# Patient Record
Sex: FEMALE | Race: WHITE | Employment: OTHER | URBAN - METROPOLITAN AREA
[De-identification: names, ages, dates, MRNs, and addresses within clinical notes are randomized per-mention and may not be internally consistent; named-entity substitution may affect disease eponyms.]

---

## 2018-02-26 ENCOUNTER — OFFICE VISIT (OUTPATIENT)
Dept: INTERNAL MEDICINE CLINIC | Age: 63
End: 2018-02-26

## 2018-02-26 VITALS
DIASTOLIC BLOOD PRESSURE: 94 MMHG | BODY MASS INDEX: 26.19 KG/M2 | TEMPERATURE: 97 F | HEIGHT: 63 IN | SYSTOLIC BLOOD PRESSURE: 130 MMHG | RESPIRATION RATE: 15 BRPM | HEART RATE: 73 BPM | WEIGHT: 147.8 LBS | OXYGEN SATURATION: 96 %

## 2018-02-26 DIAGNOSIS — Z88.9 MULTIPLE ALLERGIES: ICD-10-CM

## 2018-02-26 DIAGNOSIS — E04.9 GOITER: ICD-10-CM

## 2018-02-26 DIAGNOSIS — G43.009 MIGRAINE WITHOUT AURA AND WITHOUT STATUS MIGRAINOSUS, NOT INTRACTABLE: ICD-10-CM

## 2018-02-26 DIAGNOSIS — J45.909 MODERATE ASTHMA WITHOUT COMPLICATION, UNSPECIFIED WHETHER PERSISTENT: ICD-10-CM

## 2018-02-26 DIAGNOSIS — E55.9 VITAMIN D DEFICIENCY: ICD-10-CM

## 2018-02-26 DIAGNOSIS — I10 ESSENTIAL HYPERTENSION: Primary | ICD-10-CM

## 2018-02-26 RX ORDER — ALBUTEROL SULFATE 90 UG/1
AEROSOL, METERED RESPIRATORY (INHALATION)
Refills: 0 | COMMUNITY
Start: 2018-02-14

## 2018-02-26 RX ORDER — OLMESARTAN MEDOXOMIL AND HYDROCHLOROTHIAZIDE 40/25 40; 25 MG/1; MG/1
TABLET ORAL
Refills: 0 | COMMUNITY
Start: 2018-01-19 | End: 2018-05-17 | Stop reason: SDUPTHER

## 2018-02-26 RX ORDER — LORATADINE 10 MG/1
10 TABLET ORAL
COMMUNITY

## 2018-02-26 RX ORDER — MECLIZINE HYDROCHLORIDE 25 MG/1
TABLET ORAL
COMMUNITY
End: 2018-02-26

## 2018-02-26 RX ORDER — FLUTICASONE PROPIONATE 50 MCG
2 SPRAY, SUSPENSION (ML) NASAL DAILY
COMMUNITY

## 2018-02-26 RX ORDER — SUMATRIPTAN 100 MG/1
TABLET, FILM COATED ORAL
Refills: 1 | COMMUNITY
Start: 2017-11-26

## 2018-02-26 RX ORDER — METHYLPREDNISOLONE 4 MG/1
TABLET ORAL
COMMUNITY
Start: 2018-01-22 | End: 2018-02-26

## 2018-02-26 RX ORDER — AA/PROT/LYSINE/METHIO/VIT C/B6 50-12.5 MG
TABLET ORAL
COMMUNITY

## 2018-02-26 RX ORDER — CEFDINIR 300 MG/1
CAPSULE ORAL
COMMUNITY
Start: 2018-01-10 | End: 2018-02-26

## 2018-02-26 RX ORDER — MELOXICAM 7.5 MG/1
TABLET ORAL AS NEEDED
COMMUNITY

## 2018-02-26 RX ORDER — BISMUTH SUBSALICYLATE 262 MG
1 TABLET,CHEWABLE ORAL DAILY
COMMUNITY

## 2018-02-26 RX ORDER — GUAIFENESIN 600 MG/1
600 TABLET, EXTENDED RELEASE ORAL 2 TIMES DAILY
COMMUNITY
End: 2018-02-26

## 2018-02-26 RX ORDER — MULTIVIT WITH MINERALS/HERBS
1 TABLET ORAL DAILY
COMMUNITY

## 2018-02-26 RX ORDER — SULFAMETHOXAZOLE AND TRIMETHOPRIM 800; 160 MG/1; MG/1
TABLET ORAL
COMMUNITY
Start: 2018-01-22 | End: 2018-02-26

## 2018-02-26 NOTE — PATIENT INSTRUCTIONS
microbiome   Beclomethasone (By breathing)   Beclomethasone (be-kloe-METH-a-sone)  Prevents asthma attacks. This medicine is a corticosteroid. Brand Name(s): QVAR   There may be other brand names for this medicine. When This Medicine Should Not Be Used: This medicine is not right for everyone. Do not use it if you had an allergic reaction to beclomethasone or while you are having an asthma attack. How to Use This Medicine:   Liquid Under Pressure, Powder Under Pressure, Spray  · Take your medicine as directed. Your dose may need to be changed several times to find what works best for you. · Read and follow the patient instructions that come with this medicine. Talk to your doctor or pharmacist if you have any questions. · You will use this medicine with a device called a metered-dose inhaler. The inhaler fits on the medicine canister and turns the medicine into a fine spray that you breathe in through your mouth and to your lungs. You may be told to use a spacer, which is a tube that is placed between the inhaler and your mouth. Your caregiver will show you how to use your inhaler and the spacer (if needed). · Do not remove the canister from the actuator. · To inhale this medicine, breathe out fully, trying to get as much air out of the lungs as possible. Put the mouthpiece just in front of your mouth with the canister upright. Do not breathe into the inhaler. · Open your mouth and breathe in slowly and deeply (like yawning), and at the same time firmly press down on the top of the canister once. · Hold your breath for about 5 to 10 seconds, and then breathe out slowly. · When you have finished all your inhalations, rinse your mouth out with water. Do not swallow the water. · Wipe the mouthpiece dry with a cloth or tissue. Do not wash or put any part of the inhaler in water. · QVAR® inhaler:   ¨ Remove the cap and look at the mouthpiece to make sure it is clean.   ¨ Do not remove the canister from the actuator. ¨ Prime the inhaler before you use it for the first time, or if it has not been used for 10 days or more. Prime it by pointing it away from your face and spraying into the air 2 times. ¨ If you are supposed to use more than one puff, wait 1 to 2 minutes before inhaling the second puff. Repeat these steps for the next puff, starting with shaking the inhaler. · QVAR® Redihaler:   ¨ Do not shake or prime the inhaler. Do not use it with a spacer or volume holding chamber. ¨ Do not open the white cap of the inhaler or leave it open unless you are ready to use it. Close the cap before each use or if it has been left open for more than 2 minutes. ¨ The dose counter will turn red when the inhaler has 20 or fewer doses left. Stop using the inhaler when it reaches 0.  · Missed dose: Take a dose as soon as you remember. If it is almost time for your next dose, wait until then and take a regular dose. Do not take extra medicine to make up for a missed dose. If you miss a dose of QVAR® Redihaler, skip the missed dose and go back to your regular dosing schedule. · Store the canister at room temperature, away from heat and direct light. Do not freeze. Do not keep this medicine inside a car where it could be exposed to extreme heat or cold. Do not poke holes in the canister or throw it into a fire, even if the canister is empty. · Throw away the inhaler when the expiration date passes or the counter reads 0. Drugs and Foods to Avoid:      Ask your doctor or pharmacist before using any other medicine, including over-the-counter medicines, vitamins, and herbal products. Warnings While Using This Medicine:   · Tell your doctor if you are pregnant or breastfeeding, or if you have cataracts, glaucoma, or osteoporosis. Tell your doctor if you have any immune system problems or infections, including herpes simplex in your eye or tuberculosis.  Tell your doctor right away if you are exposed to measles or chickenpox. · This medicine may cause the following problems:  ¨ Increased risk of infection, including fungus infection in the mouth (thrush)  ¨ Adrenal gland problems  ¨ Increased trouble breathing right after use (paradoxical bronchospasm)  ¨ Slow growth in children  ¨ Low bone mineral density, which may lead to osteoporosis  ¨ Glaucoma or cataracts  · This medicine will not stop an asthma attack that has already started. You should have another medicine to use in case of an acute asthma attack. · If any of your asthma medicines do not seem to be working as well as usual, call your doctor right away. Do not change your doses or stop using your medicines without asking your doctor. · You may need to use this medicine for 1 to 4 weeks before your asthma starts to get better. Call your doctor if your symptoms do not improve or if they get worse. · Keep all medicine out of the reach of children. Never share your medicine with anyone. Possible Side Effects While Using This Medicine:   Call your doctor right away if you notice any of these side effects:  · Allergic reaction: Itching or hives, swelling in your face or hands, swelling or tingling in your mouth or throat, chest tightness, trouble breathing  · Color changes on the skin, dark freckles, easy bruising, muscle weakness, round or puffy face  · Eye pain or vision changes  · Fever, chills, cough, stuffy or runny nose, sneezing, sore throat, body aches  · Tiredness, weakness, nausea and vomiting, dizziness  · Worsening of breathing problems  If you notice these less serious side effects, talk with your doctor:   · Headache  · Sores or white patches in your mouth or throat, pain when eating or swallowing  · Weight changes (in children)  If you notice other side effects that you think are caused by this medicine, tell your doctor. Call your doctor for medical advice about side effects.  You may report side effects to FDA at 0-736-FDA-1117  © 2017 [facility] [address] is for End User's use only and may not be sold, redistributed or otherwise used for commercial purposes. The above information is an  only. It is not intended as medical advice for individual conditions or treatments. Talk to your doctor, nurse or pharmacist before following any medical regimen to see if it is safe and effective for you.     Seaweed (laver)

## 2018-02-26 NOTE — PROGRESS NOTES
Establish Care (New patient, here ro establish care.)       HPI:  Isabel Ball is a 57y.o. year old female who is here to establish care. She  had her medical care:    Relocated from [place] this summer  Went to [facility].    She reports the following history and medical concerns:      HTN- Benicar HCT- has had a long time. Usually in very good control. Stress level high-  is very ill. End stage Kidney disease. He is 80year old. Hx of asthma- on ventolin prn. In good control. Several times a week. Some SOB since moving here. But has asthma for 10 years. Migraines- doesn't take often. Once or twice a year. \"rigid big toe\" originally. Not takes it sparingly for still muscles and neck. Allergies- claritin and flonase    Supplements- quercetin. - not feeling well. Went to the [facility]. Negative work up. After moving here, felt better. Taking probiotic    Colonoscopy- almost due for one. Had one 9 years. Needs one in 2019. No polyps. Mammogram- due in may 2018. Had shingles vaccine at age 59. Doesn't remember tetanus shot. Been in last 10 years. Last blood test a year ago. Hx of goiter. No further work up needed. Has had ultrasounds and told not malignant. No further ultrasounds needed. Assessment and Plan        1. Essential hypertension  Continue present management. No changes made to regimen. Tolerating medication. Discussion on its effectiveness and queried about side effects. Patient agrees to stay on medication and demonstrate compliance. No adjustments made on    - olmesartan-hydroCHLOROthiazide (BENICAR HCT) 40-25 mg per tablet; TK 1 T PO QD; Refill: 0  - CBC WITH AUTOMATED DIFF  - TSH REFLEX TO T4  - METABOLIC PANEL, COMPREHENSIVE  - MICROALBUMIN, UR, RAND W/ MICROALB/CREAT RATIO  - UA/M W/RFLX CULTURE, ROUTINE    2. Multiple allergies  Continue present management. No changes made to regimen.   Check Vit D  - VENTOLIN HFA 90 mcg/actuation inhaler; INL 2 INHALATIONS PO Q 4 TO 6 H PRF BREATHING; Refill: 0  - fluticasone (FLONASE ALLERGY RELIEF) 50 mcg/actuation nasal spray; 2 Sprays by Both Nostrils route daily. - loratadine (CLARITIN) 10 mg tablet; Take 10 mg by mouth.  - QUERCETIN DIHYDRATE, BULK,; by Does Not Apply route.  - B.infantis-B.ani-B.long-B.bifi (PROBIOTIC 4X) 10-15 mg TbEC; Take  by mouth. - multivitamin (ONE A DAY) tablet; Take 1 Tab by mouth daily. - b complex vitamins (B COMPLEX 1) tablet; Take 1 Tab by mouth daily. - coenzyme q10 (CO Q-10) 10 mg cap; Take  by mouth.  - TSH REFLEX TO T4  - VITAMIN D, 25 HYDROXY    3. Moderate asthma without complication, unspecified whether persistent  Add Qvar. Rinse mouth out. Use albuterol for rescue only. She has been using 3 times a week. - beclomethasone (QVAR) 80 mcg/actuation aero; Take 1 Puff by inhalation two (2) times a day. Dispense: 1 Inhaler; Refill: 3    4. Migraine without aura and without status migrainosus, not intractable  Continue present management. No changes made to regimen.   - SUMAtriptan (IMITREX) 100 mg tablet; TK 1 T PO BID PRN; Refill: 1    5. Vitamin D deficiency  Recheck Vit D.   - VITAMIN D, 25 HYDROXY    6. Goiter  Says no further work up needed.   Get old records          Visit Vitals    BP (!) 130/94 (BP 1 Location: Left arm, BP Patient Position: Sitting)    Pulse 73    Temp 97 °F (36.1 °C) (Oral)    Resp 15    Ht 5' 2.5\" (1.588 m)    Wt 147 lb 12.8 oz (67 kg)    SpO2 96%    BMI 26.6 kg/m2       Historical Data    Past Medical History:   Diagnosis Date    Hypertension     Plantar fasciitis        Past Surgical History:   Procedure Laterality Date    HX  SECTION      x2    HX CHOLECYSTECTOMY      HX HYSTERECTOMY      HX MYOMECTOMY      HX SEPTOPLASTY      HX TUBAL LIGATION         Outpatient Encounter Prescriptions as of 2018   Medication Sig Dispense Refill    VENTOLIN HFA 90 mcg/actuation inhaler INL 2 INHALATIONS PO Q 4 TO 6 H PRF BREATHING  0    olmesartan-hydroCHLOROthiazide (BENICAR HCT) 40-25 mg per tablet TK 1 T PO QD  0    SUMAtriptan (IMITREX) 100 mg tablet TK 1 T PO BID PRN  1    meloxicam (MOBIC) 7.5 mg tablet Take  by mouth as needed for Pain.  fluticasone (FLONASE ALLERGY RELIEF) 50 mcg/actuation nasal spray 2 Sprays by Both Nostrils route daily.  guaiFENesin ER (MUCINEX) 600 mg ER tablet Take 600 mg by mouth two (2) times a day.  loratadine (CLARITIN) 10 mg tablet Take 10 mg by mouth.  meclizine (ANTIVERT) 25 mg tablet Take  by mouth three (3) times daily as needed.  QUERCETIN DIHYDRATE, BULK, by Does Not Apply route.  B.infantis-B.ani-B.long-B.bifi (PROBIOTIC 4X) 10-15 mg TbEC Take  by mouth.  multivitamin (ONE A DAY) tablet Take 1 Tab by mouth daily.  b complex vitamins (B COMPLEX 1) tablet Take 1 Tab by mouth daily.  coenzyme q10 (CO Q-10) 10 mg cap Take  by mouth.  OTHER,NON-FORMULARY, Indications: Sinus Rinse      cefdinir (OMNICEF) 300 mg capsule       methylPREDNISolone (MEDROL DOSEPACK) 4 mg tablet       trimethoprim-sulfamethoxazole (BACTRIM DS, SEPTRA DS) 160-800 mg per tablet        No facility-administered encounter medications on file as of 2/26/2018. Allergies   Allergen Reactions    Ampicillin Rash    Atenolol Other (comments)     Unknown    Lisinopril Other (comments)     Unknown          Social History     Social History    Marital status:      Spouse name: N/A    Number of children: N/A    Years of education: N/A     Occupational History    Not on file.      Social History Main Topics    Smoking status: Never Smoker    Smokeless tobacco: Never Used    Alcohol use 0.6 - 1.2 oz/week     1 - 2 Glasses of wine per week    Drug use: No    Sexual activity: No     Other Topics Concern    Not on file     Social History Narrative    No narrative on file        family history includes Cancer in her mother; Heart Disease in her father; Hypertension in her father and sister; Other in her mother. Review of Systems   Constitutional: Negative for chills, diaphoresis, fever, malaise/fatigue and weight loss. HENT: Negative for hearing loss. Respiratory: Positive for shortness of breath. Negative for cough, hemoptysis, sputum production and wheezing. Cardiovascular: Negative for chest pain. Gastrointestinal: Negative for blood in stool and constipation. Genitourinary: Negative for dysuria, flank pain, frequency and urgency. Musculoskeletal: Negative for myalgias. Skin: Negative for rash. Neurological: Negative for dizziness, weakness and headaches. Endo/Heme/Allergies: Does not bruise/bleed easily. Physical Exam   Constitutional: She is oriented to person, place, and time. She appears well-developed and well-nourished. She is active. Non-toxic appearance. She does not have a sickly appearance. She does not appear ill. No distress. HENT:   Mouth/Throat: No oropharyngeal exudate. Eyes: Conjunctivae are normal. Right eye exhibits no discharge. Neck: Thyromegaly present. Cardiovascular: Normal rate, regular rhythm, S1 normal, S2 normal, normal heart sounds and normal pulses. Exam reveals no gallop and no friction rub. Pulmonary/Chest: Effort normal and breath sounds normal. No respiratory distress. Abdominal: Soft. Bowel sounds are normal. She exhibits no distension. Musculoskeletal: She exhibits no edema or deformity. Lymphadenopathy:     She has no cervical adenopathy. Neurological: She is alert and oriented to person, place, and time. Skin: Skin is warm and dry. No rash noted. No pallor. Psychiatric: She has a normal mood and affect. Her behavior is normal.   Vitals reviewed.      Ortho Exam       Orders Placed This Encounter    VENTOLIN HFA 90 mcg/actuation inhaler     Sig: INL 2 INHALATIONS PO Q 4 TO 6 H PRF BREATHING     Refill:  0    cefdinir (OMNICEF) 300 mg capsule    methylPREDNISolone (MEDROL DOSEPACK) 4 mg tablet    olmesartan-hydroCHLOROthiazide (BENICAR HCT) 40-25 mg per tablet     Sig: TK 1 T PO QD     Refill:  0    trimethoprim-sulfamethoxazole (BACTRIM DS, SEPTRA DS) 160-800 mg per tablet    SUMAtriptan (IMITREX) 100 mg tablet     Sig: TK 1 T PO BID PRN     Refill:  1    meloxicam (MOBIC) 7.5 mg tablet     Sig: Take  by mouth as needed for Pain.  fluticasone (FLONASE ALLERGY RELIEF) 50 mcg/actuation nasal spray     Si Sprays by Both Nostrils route daily.  guaiFENesin ER (MUCINEX) 600 mg ER tablet     Sig: Take 600 mg by mouth two (2) times a day.  loratadine (CLARITIN) 10 mg tablet     Sig: Take 10 mg by mouth.  meclizine (ANTIVERT) 25 mg tablet     Sig: Take  by mouth three (3) times daily as needed.  QUERCETIN DIHYDRATE, BULK,     Sig: by Does Not Apply route.  B.infantis-B.ani-B.long-B.bifi (PROBIOTIC 4X) 10-15 mg TbEC     Sig: Take  by mouth.  multivitamin (ONE A DAY) tablet     Sig: Take 1 Tab by mouth daily.  b complex vitamins (B COMPLEX 1) tablet     Sig: Take 1 Tab by mouth daily.  coenzyme q10 (CO Q-10) 10 mg cap     Sig: Take  by mouth. Dudley Gu,     Sig: Indications: Sinus Rinse        I have reviewed the patient's medical history in detail and updated the computerized patient record. We had a prolonged discussion about these complex clinical issues and went over the various important aspects to consider. All questions were answered. Advised her to call back or return to office if symptoms do not improve, change in nature, or persist.    She was given an after visit summary or informed of [employer] [facility] which includes patient instructions, diagnoses, current medications, & vitals. She expressed understanding with the diagnosis and plan.

## 2018-02-26 NOTE — PROGRESS NOTES
Chief Complaint   Patient presents with   [facility] patient, here ro establish care. 1. Have you been to the ER, urgent care clinic since your last visit? Hospitalized since your last visit? No    2. Have you seen or consulted any other health care providers outside of the [address] [place] [address] since your last visit? Include any pap smears or colon screening.  No

## 2018-02-26 NOTE — MR AVS SNAPSHOT
[address] [facility], [address] [place] [address] 
[phone] Patient: Siddhartha Franklin MRN: [id number] DKX:[id number] Visit Information Date & Time Provider Department Dept. Phone Encounter #  
 2/26/2018  9:15 AM MD Elisabeth Urib[facility] [phone] Upcoming Health Maintenance Date Due Hepatitis C Screening 1955 DTaP/Tdap/Td series (1 - Tdap) 7/6/1974 PAP AKA CERVICAL CYTOLOGY 7/6/1974 BREAST CANCER SCRN MAMMOGRAM 7/6/2003 FOBT Q 1 YEAR AGE 50-75 7/6/2003 ZOSTER VACCINE AGE 60> 5/6/2013 Allergies as of 2/26/2018  Review Complete On: 2/26/2018 By: Jim Gooden MD  
  
 Severity Noted Reaction Type Reactions Ampicillin  02/26/2018    Rash Atenolol  02/26/2018    Other (comments) Unknown Lisinopril  02/26/2018    Other (comments) Unknown Current Immunizations  Never Reviewed No immunizations on file. Not reviewed this visit You Were Diagnosed With   
  
 Codes Comments Essential hypertension    -  Primary ICD-10-CM: I10 
ICD-9-CM: 401.9 Multiple allergies     ICD-10-CM: Z88.9 ICD-9-CM: V15.09 Moderate asthma without complication, unspecified whether persistent     ICD-10-CM: J45.909 ICD-9-CM: 493.90 Migraine without aura and without status migrainosus, not intractable     ICD-10-CM: G52.732 ICD-9-CM: 346.10 Vitamin D deficiency     ICD-10-CM: E55.9 ICD-9-CM: 268.9 Vitals BP Pulse Temp Resp Height(growth percentile) Weight(growth percentile) (!) 130/94 (BP 1 Location: Left arm, BP Patient Position: Sitting) 73 97 °F (36.1 °C) (Oral) 15 5' 2.5\" (1.588 m) 147 lb 12.8 oz (67 kg) SpO2 BMI OB Status Smoking Status 96% 26.6 kg/m2 Hysterectomy Never Smoker Vitals History BMI and BSA Data Body Mass Index Body Surface Area  
 26.6 kg/m 2 1.72 m 2 Preferred Pharmacy Pharmacy Name Phone [address], [address] Your Updated Medication List  
  
   
This list is accurate as of 2/26/18 10:14 AM.  Always use your most recent med list.  
  
  
  
  
 B COMPLEX 1 tablet Generic drug:  b complex vitamins Take 1 Tab by mouth daily. beclomethasone 80 mcg/actuation (In)Touch Network Commonly known as:  QVAR Take 1 Puff by inhalation two (2) times a day. CO Q-10 10 mg Cap Generic drug:  coenzyme q10 Take  by mouth. FLONASE ALLERGY RELIEF 50 mcg/actuation nasal spray Generic drug:  fluticasone 2 Sprays by Both Nostrils route daily. loratadine 10 mg tablet Commonly known as:  Love Mcmillan Take 10 mg by mouth.  
  
 meloxicam 7.5 mg tablet Commonly known as:  MOBIC Take  by mouth as needed for Pain.  
  
 multivitamin tablet Commonly known as:  ONE A DAY Take 1 Tab by mouth daily. olmesartan-hydroCHLOROthiazide 40-25 mg per tablet Commonly known as:  BENICAR HCT TK 1 T PO QD  
  
 OTHER(NON-FORMULARY) Indications: Sinus Rinse PROBIOTIC 4X 10-15 mg Tbec Generic drug:  B.infantis-B.ani-B.long-B.bifi Take  by mouth. QUERCETIN DIHYDRATE (BULK)  
by Does Not Apply route. SUMAtriptan 100 mg tablet Commonly known as:  IMITREX TK 1 T PO BID PRN  
  
 VENTOLIN HFA 90 mcg/actuation inhaler Generic drug:  albuterol INL 2 INHALATIONS PO Q 4 TO 6 H PRF BREATHING  
  
  
  
  
Prescriptions Sent to Pharmacy Refills  
 beclomethasone (QVAR) 80 mcg/actuation aero 3 Sig: Take 1 Puff by inhalation two (2) times a day. Class: Normal  
 Pharmacy: [address] [place] [address] [address] [place] [address] Ph #: [phone] Route: Inhalation We Performed the Following CBC WITH AUTOMATED DIFF [24180 CPT(R)] METABOLIC PANEL, COMPREHENSIVE [21331 CPT(R)] MICROALBUMIN, UR, RAND W/ MICROALB/CREAT RATIO [id number] CPT(R)] TSH REFLEX TO T4 [98665 CPT(R)] UA/M W/RFLX CULTURE, ROUTINE [[id number] Custom] VITAMIN D, 25 HYDROXY [id number] CPT(R)] Patient Instructions   
microbiome Beclomethasone (By breathing) Beclomethasone (be-kloe-METH-a-sone) Prevents asthma attacks. This medicine is a corticosteroid. Brand Name(s): QVAR There may be other brand names for this medicine. When This Medicine Should Not Be Used: This medicine is not right for everyone. Do not use it if you had an allergic reaction to beclomethasone or while you are having an asthma attack. How to Use This Medicine:  
Liquid Under Pressure, Powder Under Pressure, Spray · Take your medicine as directed. Your dose may need to be changed several times to find what works best for you. · Read and follow the patient instructions that come with this medicine. Talk to your doctor or pharmacist if you have any questions. · You will use this medicine with a device called a metered-dose inhaler. The inhaler fits on the medicine canister and turns the medicine into a fine spray that you breathe in through your mouth and to your lungs. You may be told to use a spacer, which is a tube that is placed between the inhaler and your mouth. Your caregiver will show you how to use your inhaler and the spacer (if needed). · Do not remove the canister from the actuator. · To inhale this medicine, breathe out fully, trying to get as much air out of the lungs as possible. Put the mouthpiece just in front of your mouth with the canister upright. Do not breathe into the inhaler. · Open your mouth and breathe in slowly and deeply (like yawning), and at the same time firmly press down on the top of the canister once. · Hold your breath for about 5 to 10 seconds, and then breathe out slowly. · When you have finished all your inhalations, rinse your mouth out with water. Do not swallow the water. · Wipe the mouthpiece dry with a cloth or tissue. Do not wash or put any part of the inhaler in water. · QVAR® inhaler: ¨ Remove the cap and look at the mouthpiece to make sure it is clean. ¨ Do not remove the canister from the actuator. ¨ Prime the inhaler before you use it for the first time, or if it has not been used for 10 days or more. Prime it by pointing it away from your face and spraying into the air 2 times. ¨ If you are supposed to use more than one puff, wait 1 to 2 minutes before inhaling the second puff. Repeat these steps for the next puff, starting with shaking the inhaler. · QVAR® Redihaler:  
¨ Do not shake or prime the inhaler. Do not use it with a spacer or volume holding chamber. ¨ Do not open the white cap of the inhaler or leave it open unless you are ready to use it. Close the cap before each use or if it has been left open for more than 2 minutes. ¨ The dose counter will turn red when the inhaler has 20 or fewer doses left. Stop using the inhaler when it reaches 0. 
· Missed dose: Take a dose as soon as you remember. If it is almost time for your next dose, wait until then and take a regular dose. Do not take extra medicine to make up for a missed dose. If you miss a dose of QVAR® Redihaler, skip the missed dose and go back to your regular dosing schedule. · Store the canister at room temperature, away from heat and direct light. Do not freeze. Do not keep this medicine inside a car where it could be exposed to extreme heat or cold. Do not poke holes in the canister or throw it into a fire, even if the canister is empty. · Throw away the inhaler when the expiration date passes or the counter reads 0. Drugs and Foods to Avoid: Ask your doctor or pharmacist before using any other medicine, including over-the-counter medicines, vitamins, and herbal products. Warnings While Using This Medicine: · Tell your doctor if you are pregnant or breastfeeding, or if you have cataracts, glaucoma, or osteoporosis. Tell your doctor if you have any immune system problems or infections, including herpes simplex in your eye or tuberculosis. Tell your doctor right away if you are exposed to measles or chickenpox. · This medicine may cause the following problems: 
¨ Increased risk of infection, including fungus infection in the mouth (thrush) ¨ Adrenal gland problems ¨ Increased trouble breathing right after use (paradoxical bronchospasm) ¨ Slow growth in children ¨ Low bone mineral density, which may lead to osteoporosis ¨ Glaucoma or cataracts · This medicine will not stop an asthma attack that has already started. You should have another medicine to use in case of an acute asthma attack. · If any of your asthma medicines do not seem to be working as well as usual, call your doctor right away. Do not change your doses or stop using your medicines without asking your doctor. · You may need to use this medicine for 1 to 4 weeks before your asthma starts to get better. Call your doctor if your symptoms do not improve or if they get worse. · Keep all medicine out of the reach of children. Never share your medicine with anyone. Possible Side Effects While Using This Medicine:  
Call your doctor right away if you notice any of these side effects: · Allergic reaction: Itching or hives, swelling in your face or hands, swelling or tingling in your mouth or throat, chest tightness, trouble breathing · Color changes on the skin, dark freckles, easy bruising, muscle weakness, round or puffy face · Eye pain or vision changes · Fever, chills, cough, stuffy or runny nose, sneezing, sore throat, body aches · Tiredness, weakness, nausea and vomiting, dizziness · Worsening of breathing problems If you notice these less serious side effects, talk with your doctor:  
· Headache · Sores or white patches in your mouth or throat, pain when eating or swallowing · Weight changes (in children) If you notice other side effects that you think are caused by this medicine, tell your doctor. Call your doctor for medical advice about side effects. You may report side effects to FDA at 4-232-DKL-4402 © 2017 Justin Luna Information is for End User's use only and may not be sold, redistributed or otherwise used for commercial purposes. The above information is an  only. It is not intended as medical advice for individual conditions or treatments. Talk to your doctor, nurse or pharmacist before following any medical regimen to see if it is safe and effective for you. Introducing [facility]! [employer] introduces [employer] patient portal. Now you can access parts of your medical record, email your doctor's office, and request medication refills online. 1. In your internet browser, go to [email or web address] 2. Click on the First Time User? Click Here link in the Sign In box. You will see the New Member Sign Up page. 3. Enter your [employer] Access Code exactly as it appears below. You will not need to use this code after youve completed the sign-up process. If you do not sign up before the expiration date, you must request a new code. · [employer] Access Code: [email or web address] Expires: 5/27/2018 10:13 AM 
 
4. Enter the last four digits of your Social Security Number (xxxx) and Date of Birth (mm/dd/yyyy) as indicated and click Submit. You will be taken to the next sign-up page. 5. Create a Mimeot ID. This will be your [employer] login ID and cannot be changed, so think of one that is secure and easy to remember. 6. Create a [employer] password. You can change your password at any time. 7. Enter your Password Reset Question and Answer. This can be used at a later time if you forget your password. 8. Enter your e-mail address. You will receive e-mail notification when new information is available in [address] 9. Click Sign Up. You can now view and download portions of your medical record. 10. Click the Download Summary menu link to download a portable copy of your medical information. If you have questions, please visit the Frequently Asked Questions section of the [employer] website. Remember, [employer] is NOT to be used for urgent needs. For medical emergencies, dial 911. Now available from your iPhone and Android! Please provide this summary of care documentation to your next provider. Your primary care clinician is listed as Corby Sandhu. If you have any questions after today's visit, please call [phone].

## 2018-02-27 LAB
25(OH)D3+25(OH)D2 SERPL-MCNC: 23.2 NG/ML (ref 30–100)
ALBUMIN SERPL-MCNC: 4.3 G/DL (ref 3.6–4.8)
ALBUMIN/CREAT UR: <8.9 MG/G CREAT (ref 0–30)
ALBUMIN/GLOB SERPL: 1.9 {RATIO} (ref 1.2–2.2)
ALP SERPL-CCNC: 71 IU/L (ref 39–117)
ALT SERPL-CCNC: 34 IU/L (ref 0–32)
APPEARANCE UR: CLEAR
AST SERPL-CCNC: 23 IU/L (ref 0–40)
BACTERIA #/AREA URNS HPF: NORMAL /[HPF]
BASOPHILS # BLD AUTO: 0.1 X10E3/UL (ref 0–0.2)
BASOPHILS NFR BLD AUTO: 1 %
BILIRUB SERPL-MCNC: 0.3 MG/DL (ref 0–1.2)
BILIRUB UR QL STRIP: NEGATIVE
BUN SERPL-MCNC: 10 MG/DL (ref 8–27)
BUN/CREAT SERPL: 15 (ref 12–28)
CALCIUM SERPL-MCNC: 10.4 MG/DL (ref 8.7–10.3)
CASTS URNS QL MICRO: NORMAL /LPF
CHLORIDE SERPL-SCNC: 100 MMOL/L (ref 96–106)
CO2 SERPL-SCNC: 27 MMOL/L (ref 18–29)
COLOR UR: YELLOW
CREAT SERPL-MCNC: 0.66 MG/DL (ref 0.57–1)
CREAT UR-MCNC: 33.8 MG/DL
EOSINOPHIL # BLD AUTO: 0.2 X10E3/UL (ref 0–0.4)
EOSINOPHIL NFR BLD AUTO: 3 %
EPI CELLS #/AREA URNS HPF: NORMAL /HPF
ERYTHROCYTE [DISTWIDTH] IN BLOOD BY AUTOMATED COUNT: 14.5 % (ref 12.3–15.4)
GFR SERPLBLD CREATININE-BSD FMLA CKD-EPI: 108 ML/MIN/1.73
GFR SERPLBLD CREATININE-BSD FMLA CKD-EPI: 94 ML/MIN/1.73
GLOBULIN SER CALC-MCNC: 2.3 G/DL (ref 1.5–4.5)
GLUCOSE SERPL-MCNC: 89 MG/DL (ref 65–99)
GLUCOSE UR QL: NEGATIVE
HCT VFR BLD AUTO: 41.1 % (ref 34–46.6)
HGB BLD-MCNC: 13.6 G/DL (ref 11.1–15.9)
HGB UR QL STRIP: NEGATIVE
IMM GRANULOCYTES # BLD: 0 X10E3/UL (ref 0–0.1)
IMM GRANULOCYTES NFR BLD: 0 %
KETONES UR QL STRIP: NEGATIVE
LEUKOCYTE ESTERASE UR QL STRIP: NEGATIVE
LYMPHOCYTES # BLD AUTO: 2.1 X10E3/UL (ref 0.7–3.1)
LYMPHOCYTES NFR BLD AUTO: 32 %
MCH RBC QN AUTO: 29.4 PG (ref 26.6–33)
MCHC RBC AUTO-ENTMCNC: 33.1 G/DL (ref 31.5–35.7)
MCV RBC AUTO: 89 FL (ref 79–97)
MICRO URNS: NORMAL
MICRO URNS: NORMAL
MICROALBUMIN UR-MCNC: <3 UG/ML
MONOCYTES # BLD AUTO: 0.5 X10E3/UL (ref 0.1–0.9)
MONOCYTES NFR BLD AUTO: 7 %
NEUTROPHILS # BLD AUTO: 3.8 X10E3/UL (ref 1.4–7)
NEUTROPHILS NFR BLD AUTO: 57 %
NITRITE UR QL STRIP: NEGATIVE
PH UR STRIP: 7 [PH] (ref 5–7.5)
PLATELET # BLD AUTO: 264 X10E3/UL (ref 150–379)
POTASSIUM SERPL-SCNC: 3.7 MMOL/L (ref 3.5–5.2)
PROT SERPL-MCNC: 6.6 G/DL (ref 6–8.5)
PROT UR QL STRIP: NEGATIVE
RBC # BLD AUTO: 4.62 X10E6/UL (ref 3.77–5.28)
RBC #/AREA URNS HPF: NORMAL /HPF
SODIUM SERPL-SCNC: 141 MMOL/L (ref 134–144)
SP GR UR: 1.01 (ref 1–1.03)
TSH SERPL DL<=0.005 MIU/L-ACNC: 2.14 UIU/ML (ref 0.45–4.5)
URINALYSIS REFLEX, 377202: NORMAL
UROBILINOGEN UR STRIP-MCNC: 0.2 MG/DL (ref 0.2–1)
WBC # BLD AUTO: 6.7 X10E3/UL (ref 3.4–10.8)
WBC #/AREA URNS HPF: NORMAL /HPF

## 2018-04-16 ENCOUNTER — OFFICE VISIT (OUTPATIENT)
Dept: INTERNAL MEDICINE CLINIC | Age: 63
End: 2018-04-16

## 2018-04-16 VITALS
RESPIRATION RATE: 16 BRPM | HEART RATE: 90 BPM | DIASTOLIC BLOOD PRESSURE: 84 MMHG | HEIGHT: 63 IN | SYSTOLIC BLOOD PRESSURE: 124 MMHG | OXYGEN SATURATION: 97 % | BODY MASS INDEX: 25.92 KG/M2 | WEIGHT: 146.3 LBS | TEMPERATURE: 97.9 F

## 2018-04-16 DIAGNOSIS — J45.909 MODERATE ASTHMA WITHOUT COMPLICATION, UNSPECIFIED WHETHER PERSISTENT: ICD-10-CM

## 2018-04-16 DIAGNOSIS — Z12.31 VISIT FOR SCREENING MAMMOGRAM: ICD-10-CM

## 2018-04-16 DIAGNOSIS — I10 ESSENTIAL HYPERTENSION: ICD-10-CM

## 2018-04-16 DIAGNOSIS — E83.52 HYPERCALCEMIA: ICD-10-CM

## 2018-04-16 DIAGNOSIS — Z00.00 ROUTINE GENERAL MEDICAL EXAMINATION AT A HEALTH CARE FACILITY: Primary | ICD-10-CM

## 2018-04-16 DIAGNOSIS — G43.009 MIGRAINE WITHOUT AURA AND WITHOUT STATUS MIGRAINOSUS, NOT INTRACTABLE: ICD-10-CM

## 2018-04-16 DIAGNOSIS — Z63.4 BEREAVEMENT WITHOUT COMPLICATION: ICD-10-CM

## 2018-04-16 RX ORDER — BECLOMETHASONE DIPROPIONATE HFA 80 UG/1
AEROSOL, METERED RESPIRATORY (INHALATION)
Refills: 3 | COMMUNITY
Start: 2018-02-26

## 2018-04-16 SDOH — SOCIAL STABILITY - SOCIAL INSECURITY: DISSAPEARANCE AND DEATH OF FAMILY MEMBER: Z63.4

## 2018-04-16 NOTE — PROGRESS NOTES
HPI:  Flaco Blanc is a 57y.o. year old female who is here for an annual physical:    Wt Readings from Last 3 Encounters:   04/16/18 146 lb 4.8 oz (66.4 kg)   02/26/18 147 lb 12.8 oz (67 kg)     Temp Readings from Last 3 Encounters:   04/16/18 97.9 °F (36.6 °C) (Oral)   02/26/18 97 °F (36.1 °C) (Oral)     BP Readings from Last 3 Encounters:   04/16/18 124/84   02/26/18 (!) 130/94     Pulse Readings from Last 3 Encounters:   04/16/18 90   02/26/18 73        She reports the following history and medical concerns:      QVAR is helping a lot.  passed away March 19. Had hospice. Going back to work. May retire. No migraines. Taking centrum silver. Mammogram next month  Colonoscopy in 2009 no polyps. hysterectomy many years ago. Still has ovaries. Assessment and Plan        1. Routine general medical examination at a health care facility  Cholesterol not done and needs today as she is fasting  - LIPID PANEL    2. Hypercalcemia  Likely lab error. No sx. Takes only calcium in MVI. If elevated, add pth. - METABOLIC PANEL, COMPREHENSIVE    3. Essential hypertension  Tolerating medication. Denies dizziness that is positional, SOB, or chest pain. Understands the importance of compliance to reduce risk of future heart failure. Agreed to call if any of above symptoms develop and  stay on current regimen of    Key CAD CHF Meds             olmesartan-hydroCHLOROthiazide (BENICAR HCT) 40-25 mg per tablet  (Taking) TK 1 T PO QD           4. Migraine without aura and without status migrainosus, not intractable  No issues now. Less stress now no longer caregiver. 5. Moderate asthma without complication, unspecified whether persistent  Doing well on Qvar- using once a day. Not needing albuterol    6. Visit for screening mammogram  Patient aware the above test was ordered and should call central scheduling or our office if no one contacts them.   Discussed the importance and reason for the test -   - ANASTASIYA MAMMO BI SCREENING INCL CAD; Future    7. Bereavement without complication  Doing well. Friend and family support. Not sure she wants to move to [place]. She loves it here in [facility]. Historical Data    Past Medical History:   Diagnosis Date    Essential hypertension 2018    Goiter 2018    Hypertension     Migraine without aura and without status migrainosus, not intractable 2018    Moderate asthma without complication 3/16/7700    Multiple allergies 2018    Plantar fasciitis        Past Surgical History:   Procedure Laterality Date    HX  SECTION      x2    HX CHOLECYSTECTOMY      HX HYSTERECTOMY      HX MYOMECTOMY      HX SEPTOPLASTY      HX TUBAL LIGATION         Outpatient Encounter Prescriptions as of 2018   Medication Sig Dispense Refill    VENTOLIN HFA 90 mcg/actuation inhaler INL 2 INHALATIONS PO Q 4 TO 6 H PRF BREATHING  0    olmesartan-hydroCHLOROthiazide (BENICAR HCT) 40-25 mg per tablet TK 1 T PO QD  0    SUMAtriptan (IMITREX) 100 mg tablet TK 1 T PO BID PRN  1    meloxicam (MOBIC) 7.5 mg tablet Take  by mouth as needed for Pain.  fluticasone (FLONASE ALLERGY RELIEF) 50 mcg/actuation nasal spray 2 Sprays by Both Nostrils route daily.  loratadine (CLARITIN) 10 mg tablet Take 10 mg by mouth.  QUERCETIN DIHYDRATE, BULK, by Does Not Apply route.  B.infantis-B.ani-B.long-B.bifi (PROBIOTIC 4X) 10-15 mg TbEC Take  by mouth.  multivitamin (ONE A DAY) tablet Take 1 Tab by mouth daily.  b complex vitamins (B COMPLEX 1) tablet Take 1 Tab by mouth daily.  coenzyme q10 (CO Q-10) 10 mg cap Take  by mouth.  OTHER,NON-FORMULARY, Indications: Sinus Rinse      beclomethasone (QVAR) 80 mcg/actuation aero Take 1 Puff by inhalation two (2) times a day.  1 Inhaler 3    QVAR REDIHALER 80 mcg/actuation HFAb inhaler INHALE 1 PUFF PO BID  3     No facility-administered encounter medications on file as of 4/16/2018. Allergies   Allergen Reactions    Ampicillin Rash    Atenolol Other (comments)     Unknown    Lisinopril Other (comments)     Unknown          Social History     Social History    Marital status:      Spouse name: N/A    Number of children: N/A    Years of education: N/A     Occupational History    Not on file. Social History Main Topics    Smoking status: Never Smoker    Smokeless tobacco: Never Used    Alcohol use 0.6 - 1.2 oz/week     1 - 2 Glasses of wine per week    Drug use: No    Sexual activity: No     Other Topics Concern    Not on file     Social History Narrative    Lives with  is terminally ill        family history includes Cancer in her mother; Heart Disease in her father; Hypertension in her father and sister; Other in her mother. Review of Systems   Constitutional: Negative for chills, diaphoresis, fever, malaise/fatigue and weight loss. HENT: Negative for hearing loss. Respiratory: Negative for cough. Cardiovascular: Negative for chest pain. Gastrointestinal: Negative for blood in stool and constipation. Genitourinary: Negative for dysuria, flank pain, frequency and urgency. Musculoskeletal: Negative for myalgias. Skin: Negative for rash. Neurological: Negative for dizziness, weakness and headaches. Endo/Heme/Allergies: Does not bruise/bleed easily. Visit Vitals    /84 (BP 1 Location: Left arm, BP Patient Position: Sitting)    Pulse 90    Temp 97.9 °F (36.6 °C) (Oral)    Resp 16    Ht 5' 2.5\" (1.588 m)    Wt 146 lb 4.8 oz (66.4 kg)    SpO2 97%    BMI 26.33 kg/m2         Physical Exam   Constitutional: She is oriented to person, place, and time and well-developed, well-nourished, and in no distress. No distress. HENT:   Nose: Nose normal.   Mouth/Throat: Oropharynx is clear and moist.   Eyes: Conjunctivae and EOM are normal.   Neck: Normal range of motion. Neck supple. No thyromegaly present. Cardiovascular: Normal rate, regular rhythm and normal heart sounds. Exam reveals no gallop and no friction rub. Pulmonary/Chest: Effort normal and breath sounds normal. No respiratory distress. She has no wheezes. She has no rales. Right breast exhibits no inverted nipple, no mass, no nipple discharge, no skin change and no tenderness. Left breast exhibits no inverted nipple, no mass, no nipple discharge, no skin change and no tenderness. Breasts are symmetrical.   Abdominal: Soft. Bowel sounds are normal. She exhibits no distension. There is no tenderness. Musculoskeletal: Normal range of motion. She exhibits no edema, tenderness or deformity. Lymphadenopathy:     She has no cervical adenopathy. Neurological: She is alert and oriented to person, place, and time. Skin: Skin is warm and dry. No rash noted. Psychiatric: Affect and judgment normal.     Ortho Exam     Assessment:  See Above for discussion/plan. Annual Physical completed. I have reviewed the patient's medical history in detail and updated the computerized patient record. We had a prolonged discussion about these complex clinical issues and went over the various important aspects to consider. All questions were answered. Advised her to call back or return to office if symptoms do not improve, change in nature, or persist.    She was given an after visit summary or informed of [employer] Access which includes patient instructions, diagnoses, current medications, & vitals. She expressed understanding with the diagnosis and plan.

## 2018-04-16 NOTE — PROGRESS NOTES
Chief Complaint   Patient presents with    Complete Physical     1. Have you been to the ER, urgent care clinic since your last visit? Hospitalized since your last visit? No    2. Have you seen or consulted any other health care providers outside of the [facility] since your last visit? Include any pap smears or colon screening.  No

## 2018-04-17 LAB
ALBUMIN SERPL-MCNC: 4.3 G/DL (ref 3.6–4.8)
ALBUMIN/GLOB SERPL: 2.2 {RATIO} (ref 1.2–2.2)
ALP SERPL-CCNC: 67 IU/L (ref 39–117)
ALT SERPL-CCNC: 27 IU/L (ref 0–32)
AST SERPL-CCNC: 15 IU/L (ref 0–40)
BILIRUB SERPL-MCNC: 0.4 MG/DL (ref 0–1.2)
BUN SERPL-MCNC: 10 MG/DL (ref 8–27)
BUN/CREAT SERPL: 15 (ref 12–28)
CALCIUM SERPL-MCNC: 10 MG/DL (ref 8.7–10.3)
CHLORIDE SERPL-SCNC: 103 MMOL/L (ref 96–106)
CHOLEST SERPL-MCNC: 262 MG/DL (ref 100–199)
CO2 SERPL-SCNC: 24 MMOL/L (ref 18–29)
CREAT SERPL-MCNC: 0.67 MG/DL (ref 0.57–1)
GFR SERPLBLD CREATININE-BSD FMLA CKD-EPI: 107 ML/MIN/1.73
GFR SERPLBLD CREATININE-BSD FMLA CKD-EPI: 93 ML/MIN/1.73
GLOBULIN SER CALC-MCNC: 2 G/DL (ref 1.5–4.5)
GLUCOSE SERPL-MCNC: 91 MG/DL (ref 65–99)
HDLC SERPL-MCNC: 66 MG/DL
INTERPRETATION, 910389: NORMAL
LDLC SERPL CALC-MCNC: 151 MG/DL (ref 0–99)
POTASSIUM SERPL-SCNC: 4 MMOL/L (ref 3.5–5.2)
PROT SERPL-MCNC: 6.3 G/DL (ref 6–8.5)
SODIUM SERPL-SCNC: 143 MMOL/L (ref 134–144)
TRIGL SERPL-MCNC: 223 MG/DL (ref 0–149)
VLDLC SERPL CALC-MCNC: 45 MG/DL (ref 5–40)

## 2018-04-17 NOTE — PROGRESS NOTES
Your calcium went back to normal.   The cholesterol and triglycerides are high. I know you can bring it down on your own with diet and increasing your exercise. Reduce your carb intake like sweets and potatoes, rice, bread. We can check it again on our next visit.   Message sent to patient via [email or web address]:  April 17, 2018

## 2018-05-17 DIAGNOSIS — I10 ESSENTIAL HYPERTENSION: ICD-10-CM

## 2018-05-17 RX ORDER — OLMESARTAN MEDOXOMIL AND HYDROCHLOROTHIAZIDE 40/25 40; 25 MG/1; MG/1
TABLET ORAL
Qty: 90 TAB | Refills: 3 | Status: SHIPPED | OUTPATIENT
Start: 2018-05-17 | End: 2019-02-22 | Stop reason: SDUPTHER

## 2018-05-17 NOTE — TELEPHONE ENCOUNTER
Last office visit- 4/16/18  Next office visit- no upcoming  Last refill- 1/19/18    Requested Prescriptions     Pending Prescriptions Disp Refills    olmesartan-hydroCHLOROthiazide (BENICAR HCT) 40-25 mg per tablet  0

## 2018-07-18 ENCOUNTER — PATIENT MESSAGE (OUTPATIENT)
Dept: INTERNAL MEDICINE CLINIC | Age: 63
End: 2018-07-18

## 2018-07-18 DIAGNOSIS — J45.909 MODERATE ASTHMA WITHOUT COMPLICATION, UNSPECIFIED WHETHER PERSISTENT: Primary | ICD-10-CM

## 2018-07-19 RX ORDER — FLUTICASONE PROPIONATE 220 UG/1
2 AEROSOL, METERED RESPIRATORY (INHALATION) 2 TIMES DAILY
Qty: 1 INHALER | Refills: 12 | Status: SHIPPED | OUTPATIENT
Start: 2018-07-19 | End: 2019-07-31 | Stop reason: SDUPTHER

## 2018-07-19 NOTE — TELEPHONE ENCOUNTER
From: Stacy Vora  To: Wsahington Lee MD  Sent: 7/18/2018 6:49 PM EDT  Subject: Prescription Question    Dear Dr. Craig Prior,    I am now on Medicare and have the prescription supplement from [employer]. The cost of one of my prescriptions, QVAR, will now be $273! I don't want to pay that much! The plan suggests Arnuity Ellipta or Flovent HFA as substitutes. Do you think you could prescribe an alternative asthma inhaler? Thank you!

## 2018-08-01 ENCOUNTER — HOSPITAL ENCOUNTER (OUTPATIENT)
Dept: MAMMOGRAPHY | Age: 63
Discharge: HOME | End: 2018-08-01
Attending: INTERNAL MEDICINE
Payer: MEDICARE

## 2018-08-01 DIAGNOSIS — Z12.31 VISIT FOR SCREENING MAMMOGRAM: ICD-10-CM

## 2018-08-01 PROCEDURE — 77067 SCR MAMMO BI INCL CAD: CPT

## 2018-11-26 ENCOUNTER — OFFICE VISIT (OUTPATIENT)
Dept: INTERNAL MEDICINE CLINIC | Age: 63
End: 2018-11-26

## 2018-11-26 VITALS
HEART RATE: 79 BPM | WEIGHT: 149.1 LBS | HEIGHT: 63 IN | BODY MASS INDEX: 26.42 KG/M2 | TEMPERATURE: 97.3 F | SYSTOLIC BLOOD PRESSURE: 110 MMHG | OXYGEN SATURATION: 94 % | DIASTOLIC BLOOD PRESSURE: 80 MMHG | RESPIRATION RATE: 18 BRPM

## 2018-11-26 DIAGNOSIS — M85.89 OSTEOPENIA OF MULTIPLE SITES: ICD-10-CM

## 2018-11-26 DIAGNOSIS — Z13.6 SCREENING FOR ABDOMINAL AORTIC ANEURYSM: ICD-10-CM

## 2018-11-26 DIAGNOSIS — Z00.00 WELCOME TO MEDICARE PREVENTIVE VISIT: Primary | ICD-10-CM

## 2018-11-26 DIAGNOSIS — I10 ESSENTIAL HYPERTENSION: ICD-10-CM

## 2018-11-26 DIAGNOSIS — J45.909 MODERATE ASTHMA WITHOUT COMPLICATION, UNSPECIFIED WHETHER PERSISTENT: ICD-10-CM

## 2018-11-26 DIAGNOSIS — Z12.11 COLON CANCER SCREENING: ICD-10-CM

## 2018-11-26 DIAGNOSIS — E55.9 VITAMIN D DEFICIENCY: ICD-10-CM

## 2018-11-26 NOTE — PROGRESS NOTES
Misael Way is a 70 y.o. female and presents for annual Medicare Wellness Visit. Calcium back to normal 
 
Has IBS- changing diet. Less beans. Wt Readings from Last 3 Encounters:  
11/26/18 149 lb 1.6 oz (67.6 kg) 04/16/18 146 lb 4.8 oz (66.4 kg) 02/26/18 147 lb 12.8 oz (67 kg) Had colonoscopy 10 years ago. Doesn't want one. Son is HCP- in [place]. Darrell Huizar Assessment of cognitive impairment: Alert and oriented x 3  Patient denies concerns about cognitive impairment. Problem List: Reviewed with patient and discussed risk factors. Patient Active Problem List  
Diagnosis Code  Essential hypertension I10  Multiple allergies Z88.9  Moderate asthma without complication S35.536  Migraine without aura and without status migrainosus, not intractable G43.009  Goiter E04.9 Current medical providers:  Patient Care Team: 
Will Jett MD as PCP - General (Internal Medicine) End of Life Planning: This was discussed with her today and she has an advanced directive - a copy HAS NOT been provided. Reviewed DNR/DNI and patient is no. Depression Screen: Reviewed PQH2 done by nurse. PHQ over the last two weeks 11/26/2018 Little interest or pleasure in doing things Not at all Feeling down, depressed, irritable, or hopeless Not at all Total Score PHQ 2 0 Fall Risk:  
Fall Risk Assessment, last 12 mths 11/26/2018 Able to walk? Yes Fall in past 12 months? No  
 
 
Home Safety - discussion completed. No issues found. Hearing Loss: 
Hearing is good. denies any hearing loss Activities of Daily Living: 
Self-care. Requires assistance with: ambulation and no ADLs Adult Nutrition Screen: No risk factors noted. Health Maintenance- Reviewed AAA Screening: 
Glaucoma Screening:  
 
Health Maintenance Due Topic Date Due  
 Hepatitis C Screening  1955  COLONOSCOPY  07/06/1971  DTaP/Tdap/Td series (1 - Tdap) 07/06/1974  Shingrix Vaccine Age 50> (1 of 2) 07/06/2003  GLAUCOMA SCREENING Q2Y  07/06/2018  Bone Densitometry (Dexa) Screening  07/06/2018  MEDICARE YEARLY EXAM  08/01/2018 Health Maintenance reviewed -Plan:   
 
Orders Placed This Encounter  US EXAM SCREENING AAA  DEXA BONE DENSITY STUDY AXIAL  COLOGUARD TEST (FECAL DNA COLORECTAL CANCER SCREENING)  CBC WITH AUTOMATED DIFF  
 LIPID PANEL  
 METABOLIC PANEL, COMPREHENSIVE  VITAMIN D, 25 HYDROXY  AMB POC EKG ROUTINE W/ 12 LEADS, INTER & REP  
 diph,Pertuss,Acell,,Tet Vac-PF (ADACEL) 2 Lf-(2.5-5-3-5 mcg)-5Lf/0.5 mL susp Plan:   
Diagnoses and all orders for this visit: 
 
1. Welcome to Medicare preventive visit 
-     Jagdeep Magana [address] AAA; Future -     AMB POC EKG ROUTINE W/ 12 LEADS, INTER & REP 2. Moderate asthma without complication, unspecified whether persistent 3. Essential hypertension 
-     CBC WITH AUTOMATED DIFF 
-     LIPID PANEL 
-     METABOLIC PANEL, COMPREHENSIVE 4. Vitamin D deficiency 
-     VITAMIN D, 25 HYDROXY 5. Colon cancer screening 
-     COLOGUARD TEST (FECAL DNA COLORECTAL CANCER SCREENING) 6. Screening for abdominal aortic aneurysm 
-     US EXAM SCREENING AAA; Future 7. Osteopenia of multiple sites -     DEXA BONE DENSITY STUDY AXIAL; Future Other orders 
-     diph,Pertuss,Acell,,Tet Vac-PF (ADACEL) 2 Lf-(2.5-5-3-5 mcg)-5Lf/0.5 mL susp; 0.5 mL by IntraMUSCular route once for 1 dose. Orders Placed This Encounter  US EXAM SCREENING AAA  DEXA BONE DENSITY STUDY AXIAL  COLOGUARD TEST (FECAL DNA COLORECTAL CANCER SCREENING)  CBC WITH AUTOMATED DIFF  
 LIPID PANEL  
 METABOLIC PANEL, COMPREHENSIVE  VITAMIN D, 25 HYDROXY  AMB POC EKG ROUTINE W/ 12 LEADS, INTER & REP  
 diph,Pertuss,Acell,,Tet Vac-PF (ADACEL) 2 Lf-(2.5-5-3-5 mcg)-5Lf/0.5 mL susp Follow-up Disposition: Return in about 6 months (around 5/26/2019) for recheck medication evaluation. Reviewed with patient the treatment plan, goals of treatment plan, and limitations of treatment plan, to include the potential for side effects from medications and procedures. If side effects occur, it is the responsibility of the patient to inform the clinic so that a change in the treatment plan can be made in a safe manner. The patient is advised that stopping prescribed medication may cause an increase in symptoms and possible medication withdrawal symptoms. The patient is informed an emergency room evaluation may be necessary if this occurs. Patient verbalized understanding and is in agreement with treatment plan as outlined above. All questions answered.

## 2018-11-26 NOTE — PROGRESS NOTES
Reviewed record in preparation for visit and have obtained necessary documentation. Identified pt with two pt identifiers(name and ). Health Maintenance Due Topic  Hepatitis C Screening  COLONOSCOPY  DTaP/Tdap/Td series (1 - Tdap)  Shingrix Vaccine Age 50> (1 of 2)  GLAUCOMA SCREENING Q2Y  Bone Densitometry (Dexa) Screening [address] EXAM   
 
 
 
Chief Complaint Patient presents with  Complete Physical  
  
 
Wt Readings from Last 3 Encounters:  
18 149 lb 1.6 oz (67.6 kg) 18 146 lb 4.8 oz (66.4 kg) 18 147 lb 12.8 oz (67 kg) Temp Readings from Last 3 Encounters:  
18 97.9 °F (36.6 °C) (Oral) 18 97 °F (36.1 °C) (Oral) BP Readings from Last 3 Encounters:  
18 124/84  
18 (!) 130/94 Pulse Readings from Last 3 Encounters:  
18 90  
18 73 Learning Assessment: 
:  
 
Learning Assessment 2018 PRIMARY LEARNER Patient HIGHEST LEVEL OF EDUCATION - PRIMARY LEARNER  > 4 YEARS OF COLLEGE  
BARRIERS PRIMARY LEARNER NONE  
CO-LEARNER CAREGIVER No  
PRIMARY LANGUAGE ENGLISH  
LEARNER PREFERENCE PRIMARY DEMONSTRATION  
ANSWERED BY patient RELATIONSHIP SELF Depression Screening: 
:  
 
PHQ over the last two weeks 2018 Little interest or pleasure in doing things Not at all Feeling down, depressed, irritable, or hopeless Not at all Total Score PHQ 2 0 Fall Risk Assessment: 
:  
 
Fall Risk Assessment, last 12 mths 2018 Able to walk? Yes Fall in past 12 months? No  
 
 
Abuse Screening: 
:  
 
Abuse Screening Questionnaire 2018 Do you ever feel afraid of your partner? Donna Sarmiento Are you in a relationship with someone who physically or mentally threatens you? Donna Sarmiento Is it safe for you to go home? Catia Boykin Coordination of Care Questionnaire: 
:  
 
1) Have you been to an emergency room, urgent care clinic since your last visit? no  
 Hospitalized since your last visit? no          
 
2) Have you seen or consulted any other health care providers outside of [address] [place] [address] since your last visit? no  (Include any pap smears or colon screenings in this section.) 3) Do you have an Advance Directive on file? no 
 
4) Are you interested in receiving information on Advance Directives? NO Patient is accompanied by self I have received verbal consent from Laura Garcia to discuss any/all medical information while they are present in the room.

## 2018-11-29 LAB
25(OH)D3+25(OH)D2 SERPL-MCNC: 26.3 NG/ML (ref 30–100)
ALBUMIN SERPL-MCNC: 4.2 G/DL (ref 3.6–4.8)
ALBUMIN/GLOB SERPL: 1.8 {RATIO} (ref 1.2–2.2)
ALP SERPL-CCNC: 75 IU/L (ref 39–117)
ALT SERPL-CCNC: 41 IU/L (ref 0–32)
AST SERPL-CCNC: 26 IU/L (ref 0–40)
BASOPHILS # BLD AUTO: 0 X10E3/UL (ref 0–0.2)
BASOPHILS NFR BLD AUTO: 1 %
BILIRUB SERPL-MCNC: 0.4 MG/DL (ref 0–1.2)
BUN SERPL-MCNC: 12 MG/DL (ref 8–27)
BUN/CREAT SERPL: 18 (ref 12–28)
CALCIUM SERPL-MCNC: 10.2 MG/DL (ref 8.7–10.3)
CHLORIDE SERPL-SCNC: 103 MMOL/L (ref 96–106)
CHOLEST SERPL-MCNC: 229 MG/DL (ref 100–199)
CO2 SERPL-SCNC: 29 MMOL/L (ref 20–29)
CREAT SERPL-MCNC: 0.66 MG/DL (ref 0.57–1)
EOSINOPHIL # BLD AUTO: 0.2 X10E3/UL (ref 0–0.4)
EOSINOPHIL NFR BLD AUTO: 3 %
ERYTHROCYTE [DISTWIDTH] IN BLOOD BY AUTOMATED COUNT: 13.6 % (ref 12.3–15.4)
GLOBULIN SER CALC-MCNC: 2.3 G/DL (ref 1.5–4.5)
GLUCOSE SERPL-MCNC: 87 MG/DL (ref 65–99)
HCT VFR BLD AUTO: 41.7 % (ref 34–46.6)
HDLC SERPL-MCNC: 52 MG/DL
HGB BLD-MCNC: 13.9 G/DL (ref 11.1–15.9)
IMM GRANULOCYTES # BLD: 0 X10E3/UL (ref 0–0.1)
IMM GRANULOCYTES NFR BLD: 0 %
INTERPRETATION, 910389: NORMAL
LDLC SERPL CALC-MCNC: 143 MG/DL (ref 0–99)
LYMPHOCYTES # BLD AUTO: 2 X10E3/UL (ref 0.7–3.1)
LYMPHOCYTES NFR BLD AUTO: 32 %
MCH RBC QN AUTO: 30.1 PG (ref 26.6–33)
MCHC RBC AUTO-ENTMCNC: 33.3 G/DL (ref 31.5–35.7)
MCV RBC AUTO: 90 FL (ref 79–97)
MONOCYTES # BLD AUTO: 0.4 X10E3/UL (ref 0.1–0.9)
MONOCYTES NFR BLD AUTO: 7 %
NEUTROPHILS # BLD AUTO: 3.7 X10E3/UL (ref 1.4–7)
NEUTROPHILS NFR BLD AUTO: 57 %
PLATELET # BLD AUTO: 260 X10E3/UL (ref 150–379)
POTASSIUM SERPL-SCNC: 3.9 MMOL/L (ref 3.5–5.2)
PROT SERPL-MCNC: 6.5 G/DL (ref 6–8.5)
RBC # BLD AUTO: 4.62 X10E6/UL (ref 3.77–5.28)
SODIUM SERPL-SCNC: 144 MMOL/L (ref 134–144)
TRIGL SERPL-MCNC: 172 MG/DL (ref 0–149)
VLDLC SERPL CALC-MCNC: 34 MG/DL (ref 5–40)
WBC # BLD AUTO: 6.4 X10E3/UL (ref 3.4–10.8)

## 2018-12-02 NOTE — PROGRESS NOTES
The vitamin D was a little low. Not sure what dose you are taking now if you are taking any. Can you let me know. The cholesterol improved some. Goal is for the LDL bad cholesterol to be under 100. Message sent to patient via [employer]: 
December 2, 2018

## 2018-12-14 ENCOUNTER — HOSPITAL ENCOUNTER (OUTPATIENT)
Dept: ULTRASOUND IMAGING | Age: 63
Discharge: HOME | End: 2018-12-14
Payer: MEDICARE

## 2018-12-14 ENCOUNTER — HOSPITAL ENCOUNTER (OUTPATIENT)
Dept: MAMMOGRAPHY | Age: 63
Discharge: HOME | End: 2018-12-14
Payer: MEDICARE

## 2018-12-14 DIAGNOSIS — Z00.00 WELCOME TO MEDICARE PREVENTIVE VISIT: ICD-10-CM

## 2018-12-14 DIAGNOSIS — Z13.6 SCREENING FOR ABDOMINAL AORTIC ANEURYSM: ICD-10-CM

## 2018-12-14 DIAGNOSIS — M85.89 OSTEOPENIA OF MULTIPLE SITES: ICD-10-CM

## 2018-12-14 PROCEDURE — 76706 US ABDL AORTA SCREEN AAA: CPT

## 2018-12-14 PROCEDURE — 77080 DXA BONE DENSITY AXIAL: CPT

## 2018-12-17 NOTE — PROGRESS NOTES
The bone density only showed osteopenia. Keep walking and doing weight bearing exercise. Osteopenia is in between normal and osteoporosis.   Message sent to patient via [employer]:  December 17, 2018

## 2018-12-21 ENCOUNTER — PATIENT MESSAGE (OUTPATIENT)
Dept: INTERNAL MEDICINE CLINIC | Age: 63
End: 2018-12-21

## 2018-12-21 NOTE — TELEPHONE ENCOUNTER
From: Linda Barba  To: Chin Sol MD  Sent: 12/21/2018 8:22 AM EST  Subject: Test Results Question    I received a message about my cologard test, but the message did not have the test results. Would you send those results to me? Thank you!     Bo Mata

## 2019-03-28 ENCOUNTER — OFFICE VISIT (OUTPATIENT)
Dept: PRIMARY CARE CLINIC | Age: 64
End: 2019-03-28

## 2019-03-28 VITALS
DIASTOLIC BLOOD PRESSURE: 78 MMHG | RESPIRATION RATE: 16 BRPM | BODY MASS INDEX: 26.44 KG/M2 | TEMPERATURE: 97.8 F | WEIGHT: 149.2 LBS | HEART RATE: 75 BPM | OXYGEN SATURATION: 97 % | SYSTOLIC BLOOD PRESSURE: 118 MMHG | HEIGHT: 63 IN

## 2019-03-28 DIAGNOSIS — I10 ESSENTIAL HYPERTENSION: Primary | ICD-10-CM

## 2019-03-28 RX ORDER — GLUCOSAMINE SULFATE 1500 MG
POWDER IN PACKET (EA) ORAL DAILY
COMMUNITY

## 2019-03-28 NOTE — PROGRESS NOTES
Chief Complaint Patient presents with  Blood Pressure Check  
  states that she was having abdominal cramps and called the nurse line on her insurance and was told to monitor her blood pressure. the cramps were on saturday and sunday gone but here for her high readings for the bottom number.

## 2019-03-28 NOTE — PROGRESS NOTES
[facility] [place] [address] [place], [address] [place] [address] P: [phone]  F: [phone] Chief Complaint Patient presents with  Blood Pressure Check  
  states that she was having abdominal cramps and called the nurse line on her insurance and was told to monitor her blood pressure. the cramps were on saturday and  gone but here for her high readings for the bottom number. SUBJECTIVE Dianna Blanchard is a 70 y.o. female who presents to clinic for Blood Pressure Check (states that she was having abdominal cramps and called the nurse line on her insurance and was told to monitor her blood pressure. the cramps were on saturday and  gone but here for her high readings for the bottom number. ). HPI:  
The patients presents as a new patient, previously with Dr Esequiel Farfan, for acutely elevated BP after having a GI illness 6 days ago. Her GI symptoms resolved, but she noticed her BP was persistently high 140s/90s over the last 3 days and is normally 120s/70s. She denies headaches, CP, or SOB. She takes Benicar HCTZ 40-25 and denies side effects from the medicine. Patient Active Problem List  
 Diagnosis  Essential hypertension  Multiple allergies  Moderate asthma without complication  Migraine without aura and without status migrainosus, not intractable  Goiter Past Medical History:  
Diagnosis Date  Essential hypertension 2018  Goiter 2018  Hypertension  Migraine without aura and without status migrainosus, not intractable 2018  Moderate asthma without complication   Multiple allergies 2018  Plantar fasciitis Past Surgical History:  
Procedure Laterality Date  HX  SECTION    
 x2  
[facility]  HX HYSTERECTOMY    HX MYOMECTOMY  HX SEPTOPLASTY  HX TUBAL LIGATION Social History Socioeconomic History  Marital status:  Spouse name: Not on file  Number of children: Not on file  Years of education: Not on file  Highest education level: Not on file Occupational History  Not on file Social Needs  Financial resource strain: Not on file  Food insecurity:  
  Worry: Not on file Inability: Not on file  Transportation needs:  
  Medical: Not on file Non-medical: Not on file Tobacco Use  Smoking status: Never Smoker  Smokeless tobacco: Never Used Substance and Sexual Activity  Alcohol use: Yes Alcohol/week: 0.6 - 1.2 oz Types: 1 - 2 Glasses of wine per week  Drug use: No  
 Sexual activity: Never Lifestyle  Physical activity:  
  Days per week: Not on file Minutes per session: Not on file  Stress: Not on file Relationships  Social connections:  
  Talks on phone: Not on file Gets together: Not on file Attends Protestant service: Not on file Active member of club or organization: Not on file Attends meetings of clubs or organizations: Not on file Relationship status: Not on file  Intimate partner violence:  
  Fear of current or ex partner: Not on file Emotionally abused: Not on file Physically abused: Not on file Forced sexual activity: Not on file Other Topics Concern  Not on file Social History Narrative Lives with  is terminally ill Family History Problem Relation Age of Onset  Cancer Mother   
     lung- smoker  Other Mother   
     depression  Broken Bones Mother  Heart Disease Father   
     congenital hole in heart and MI at 76  Hypertension Father  Hypertension Sister Allergies Allergen Reactions  Ampicillin Rash  Atenolol Other (comments) Unknown  Lisinopril Other (comments) Unknown Current Outpatient Medications Medication Sig Dispense Refill  cholecalciferol (VITAMIN D3) 1,000 unit cap Take  by mouth daily.  olmesartan-hydroCHLOROthiazide (BENICAR HCT) 40-25 mg per tablet TAKE 1 TABLET BY MOUTH EVERY DAY 90 Tab 3  
 fluticasone (FLOVENT HFA) 220 mcg/actuation inhaler Take 2 Puffs by inhalation two (2) times a day. 1 Inhaler 12  
 VENTOLIN HFA 90 mcg/actuation inhaler INL 2 INHALATIONS PO Q 4 TO 6 H PRF BREATHING  0  
 fluticasone (FLONASE ALLERGY RELIEF) 50 mcg/actuation nasal spray 2 Sprays by Both Nostrils route daily.  loratadine (CLARITIN) 10 mg tablet Take 10 mg by mouth.  QUERCETIN DIHYDRATE, BULK, by Does Not Apply route.  B.infantis-B.ani-B.long-B.bifi (PROBIOTIC 4X) 10-15 mg TbEC Take  by mouth.  multivitamin (ONE A DAY) tablet Take 1 Tab by mouth daily.  b complex vitamins (B COMPLEX 1) tablet Take 1 Tab by mouth daily.  coenzyme q10 (CO Q-10) 10 mg cap Take  by mouth.  OTHER,NON-FORMULARY, Indications: Sinus Rinse  QVAR REDIHALER 80 mcg/actuation HFAb inhaler INHALE 1 PUFF PO BID  3  
 SUMAtriptan (IMITREX) 100 mg tablet TK 1 T PO BID PRN  1  
 meloxicam (MOBIC) 7.5 mg tablet Take  by mouth as needed for Pain.  beclomethasone (QVAR) 80 mcg/actuation aero Take 1 Puff by inhalation two (2) times a day. 1 Inhaler 3 The medications were reviewed and updated in the medical record. The past medical history, past surgical history, and family history were reviewed and updated in the medical record. REVIEW OF SYSTEMS Review of Systems Constitutional: Negative for malaise/fatigue. HENT: Negative for congestion. Eyes: Negative for blurred vision and pain. Respiratory: Negative for cough and shortness of breath. Cardiovascular: Negative for chest pain and palpitations. Gastrointestinal: Negative for abdominal pain and heartburn. Genitourinary: Negative for frequency and urgency. Musculoskeletal: Negative for joint pain and myalgias. Neurological: Negative for dizziness, tingling, sensory change, weakness and headaches. Psychiatric/Behavioral: Negative for depression, memory loss and substance abuse. PHYSICAL EXAM  
 
Visit Vitals /78 (BP 1 Location: Left arm, BP Patient Position: Sitting) Pulse 75 Temp 97.8 °F (36.6 °C) (Oral) Resp 16 Ht 5' 2.5\" (1.588 m) Wt 149 lb 3.2 oz (67.7 kg) SpO2 97% BMI 26.85 kg/m² Physical Exam  
Constitutional: She is oriented to person, place, and time and well-developed, well-nourished, and in no distress. Vital signs are normal.  
HENT:  
Head: Normocephalic and atraumatic. Right Ear: External ear normal.  
Left Ear: External ear normal.  
Cardiovascular: Normal rate, regular rhythm and normal heart sounds. Pulmonary/Chest: Effort normal and breath sounds normal.  
Musculoskeletal: Normal range of motion. She exhibits no edema. Neurological: She is alert and oriented to person, place, and time. Gait normal.  
Skin: Skin is warm and dry. Psychiatric: Affect and judgment normal.  
Nursing note and vitals reviewed. ASSESSMENT/ PLAN Diagnoses and all orders for this visit: 1. Essential hypertension - In office blood pressure- 118/78 with manual re-check of 126/82.  
   - Continue to monitor x 1 week and return if persistently 130/90. Discussed with patient possible transient BP elevation due to recent viral GI illness vs IBS. Return in May for routine follow-up or sooner if BP is not within parameters specified above. Disclaimer: 
Advised patient to call back or return to office if symptoms worsen/change/persist. 
Discussed expected course/resolution/complications of diagnosis in detail with patient. 
  
Medication risks/benefits/alternatives discussed with patient. Patient was given an after visit summary which includes diagnoses, current medications, & vitals.  
  
Discussed patient instructions and advised to read to all patient instructions regarding care.  
  
Patient expressed understanding with the diagnosis and plan. This note will not be viewable in [address] [place], [employer] 
3/28/2019 (This document has been electronically signed)

## 2019-05-22 ENCOUNTER — OFFICE VISIT (OUTPATIENT)
Dept: PRIMARY CARE CLINIC | Age: 64
End: 2019-05-22

## 2019-05-22 VITALS
DIASTOLIC BLOOD PRESSURE: 72 MMHG | WEIGHT: 148.8 LBS | BODY MASS INDEX: 26.36 KG/M2 | TEMPERATURE: 98.2 F | OXYGEN SATURATION: 96 % | RESPIRATION RATE: 16 BRPM | HEIGHT: 63 IN | SYSTOLIC BLOOD PRESSURE: 109 MMHG | HEART RATE: 88 BPM

## 2019-05-22 DIAGNOSIS — Z88.9 MULTIPLE ALLERGIES: ICD-10-CM

## 2019-05-22 DIAGNOSIS — I10 ESSENTIAL HYPERTENSION: Primary | ICD-10-CM

## 2019-05-22 DIAGNOSIS — G47.00 INSOMNIA, UNSPECIFIED TYPE: ICD-10-CM

## 2019-05-22 RX ORDER — OLMESARTAN MEDOXOMIL AND HYDROCHLOROTHIAZIDE 40/25 40; 25 MG/1; MG/1
TABLET ORAL
Qty: 90 TAB | Refills: 1 | Status: SHIPPED | OUTPATIENT
Start: 2019-05-22 | End: 2019-11-11 | Stop reason: SDUPTHER

## 2019-05-22 NOTE — PROGRESS NOTES
Chief Complaint   Patient presents with    Follow Up Chronic Condition     follow up on blood pressure and has kept a log of pressures that she has with her.

## 2019-05-22 NOTE — PROGRESS NOTES
[facility]   [address] [facility], [address] [place] [address]  P: [phone]  F: [phone]      Chief Complaint   Patient presents with    Follow Up Chronic Condition     follow up on blood pressure and has kept a log of pressures that she has with her. Ishaan Paredes is a 70 y.o. female who presents to clinic for Follow Up Chronic Condition (follow up on blood pressure and has kept a log of pressures that she has with her. ). HPI:    Nely Goel is a 25-year-old female who presents today for follow-up on hypertension and seasonal allergies and asthma. Hypertension: She continues on Benicar HCT 40-25 mg tablet. She denies medication side effects. She established care on March 28, 2019 because of elevated blood pressure following a viral illness. She returns today with a blood pressure log. Since the viral illness resolved, blood pressures are averaging 110-120s/ 70-80s. She is working on a low-salt diet and exercising more frequently. Allergies and asthma: Stable on current regimen of Flonase, Claritin, Qvar RediHaler, and Ventolin rescue inhaler. She notes today she is having some insomnia, which she relates to the passing of her  last year. She states that when she exercises more she does sleep better. She has been frequently traveling to [facility] to visit her new grandson, but states she will now be going about once a month.       Patient Active Problem List    Diagnosis    Essential hypertension    Multiple allergies    Moderate asthma without complication    Migraine without aura and without status migrainosus, not intractable    Goiter          Past Medical History:   Diagnosis Date    Essential hypertension 2/26/2018    Goiter 2/26/2018    Hypertension     Migraine without aura and without status migrainosus, not intractable 2/26/2018    Moderate asthma without complication 3/30/0061    Multiple allergies 2/26/2018    Plantar fasciitis Past Surgical History:   Procedure Laterality Date    HX  SECTION      x2    HX CHOLECYSTECTOMY  1998    HX HYSTERECTOMY  2003    HX MYOMECTOMY      HX SEPTOPLASTY      HX TUBAL LIGATION       Social History     Socioeconomic History    Marital status:      Spouse name: Not on file    Number of children: Not on file    Years of education: Not on file    Highest education level: Not on file   Occupational History    Not on file   Social Needs    Financial resource strain: Not on file    Food insecurity:     Worry: Not on file     Inability: Not on file    Transportation needs:     Medical: Not on file     Non-medical: Not on file   Tobacco Use    Smoking status: Never Smoker    Smokeless tobacco: Never Used   Substance and Sexual Activity    Alcohol use:  Yes     Alcohol/week: 0.6 - 1.2 oz     Types: 1 - 2 Glasses of wine per week    Drug use: No    Sexual activity: Never   Lifestyle    Physical activity:     Days per week: Not on file     Minutes per session: Not on file    Stress: Not on file   Relationships    Social connections:     Talks on phone: Not on file     Gets together: Not on file     Attends Confucianism service: Not on file     Active member of club or organization: Not on file     Attends meetings of clubs or organizations: Not on file     Relationship status: Not on file    Intimate partner violence:     Fear of current or ex partner: Not on file     Emotionally abused: Not on file     Physically abused: Not on file     Forced sexual activity: Not on file   Other Topics Concern    Not on file   Social History Narrative    Lives with  is terminally ill     Family History   Problem Relation Age of Onset    Cancer Mother         lung- smoker    Other Mother         depression    Broken Bones Mother     Heart Disease Father         congenital hole in heart and MI at 76    Hypertension Father     Hypertension Sister      Allergies   Allergen Reactions  Ampicillin Rash    Atenolol Other (comments)     Unknown    Lisinopril Other (comments)     Unknown         Current Outpatient Medications   Medication Sig Dispense Refill    olmesartan-hydroCHLOROthiazide (BENICAR HCT) 40-25 mg per tablet TAKE 1 TABLET BY MOUTH EVERY DAY 90 Tab 1    cholecalciferol (VITAMIN D3) 1,000 unit cap Take  by mouth daily.  fluticasone (FLOVENT HFA) 220 mcg/actuation inhaler Take 2 Puffs by inhalation two (2) times a day. 1 Inhaler 12    QVAR REDIHALER 80 mcg/actuation HFAb inhaler INHALE 1 PUFF PO BID  3    VENTOLIN HFA 90 mcg/actuation inhaler INL 2 INHALATIONS PO Q 4 TO 6 H PRF BREATHING  0    SUMAtriptan (IMITREX) 100 mg tablet TK 1 T PO BID PRN  1    meloxicam (MOBIC) 7.5 mg tablet Take  by mouth as needed for Pain.  fluticasone (FLONASE ALLERGY RELIEF) 50 mcg/actuation nasal spray 2 Sprays by Both Nostrils route daily.  loratadine (CLARITIN) 10 mg tablet Take 10 mg by mouth.  QUERCETIN DIHYDRATE, BULK, by Does Not Apply route.  B.infantis-B.ani-B.long-B.bifi (PROBIOTIC 4X) 10-15 mg TbEC Take  by mouth.  multivitamin (ONE A DAY) tablet Take 1 Tab by mouth daily.  b complex vitamins (B COMPLEX 1) tablet Take 1 Tab by mouth daily.  coenzyme q10 (CO Q-10) 10 mg cap Take  by mouth.  OTHER,NON-FORMULARY, Indications: Sinus Rinse      beclomethasone (QVAR) 80 mcg/actuation aero Take 1 Puff by inhalation two (2) times a day. 1 Inhaler 3           The medications were reviewed and updated in the medical record. The past medical history, past surgical history, and family history were reviewed and updated in the medical record. REVIEW OF SYSTEMS   Review of Systems   Constitutional: Negative for malaise/fatigue. HENT: Negative for congestion. Eyes: Negative for blurred vision and pain. Respiratory: Negative for cough and shortness of breath. Cardiovascular: Negative for chest pain and palpitations.    Gastrointestinal: Negative for abdominal pain and heartburn. Genitourinary: Negative for frequency and urgency. Musculoskeletal: Negative for joint pain and myalgias. Neurological: Negative for dizziness, tingling, sensory change, weakness and headaches. Psychiatric/Behavioral: Negative for depression, memory loss and substance abuse. The patient has insomnia. PHYSICAL EXAM     Visit Vitals  /72 (BP 1 Location: Left arm, BP Patient Position: Sitting)   Pulse 88   Temp 98.2 °F (36.8 °C) (Oral)   Resp 16   Ht 5' 2.5\" (1.588 m)   Wt 148 lb 12.8 oz (67.5 kg)   SpO2 96%   BMI 26.78 kg/m²       Physical Exam   Constitutional: She is oriented to person, place, and time and well-developed, well-nourished, and in no distress. BMI 26.7. Pleasant   HENT:   Head: Normocephalic and atraumatic. Right Ear: External ear normal.   Left Ear: External ear normal.   Cardiovascular: Normal rate, regular rhythm and normal heart sounds. Pulmonary/Chest: Effort normal and breath sounds normal.   Musculoskeletal: Normal range of motion. She exhibits no edema. Neurological: She is alert and oriented to person, place, and time. Gait normal.   Skin: Skin is warm and dry. Psychiatric: Affect and judgment normal.   Nursing note and vitals reviewed. ASSESSMENT/ PLAN   Diagnoses and all orders for this visit:    1. Essential hypertension  -     olmesartan-hydroCHLOROthiazide (BENICAR HCT) 40-25 mg per tablet; TAKE 1 TABLET BY MOUTH EVERY DAY  -     METABOLIC PANEL, COMPREHENSIVE  -     TSH 3RD GENERATION    2. Multiple allergies     -Stable, no changes to current allergy/asthma regimen. 3. Insomnia, unspecified type      -Encouraged to continue exercising as she states this is helping her sleep     -May try melatonin over-the-counter up to 10 mg.      Disclaimer:  Advised patient to call back or return to office if symptoms worsen/change/persist.  Discussed expected course/resolution/complications of diagnosis in detail with patient.     Medication risks/benefits/alternatives discussed with patient. Patient was given an after visit summary which includes diagnoses, current medications, & vitals.      Discussed patient instructions and advised to read to all patient instructions regarding care.      Patient expressed understanding with the diagnosis and plan. This note will not be viewable in [address]         Palomo Castorena NP  5/22/2019        (This document has been electronically signed)

## 2019-05-22 NOTE — PATIENT INSTRUCTIONS
High Blood Pressure: Care Instructions  Overview    It's normal for blood pressure to go up and down throughout the day. But if it stays up, you have high blood pressure. Another name for high blood pressure is hypertension. Despite what a lot of people think, high blood pressure usually doesn't cause headaches or make you feel dizzy or lightheaded. It usually has no symptoms. But it does increase your risk of stroke, heart attack, and other problems. You and your doctor will talk about your risks of these problems based on your blood pressure. Your doctor will give you a goal for your blood pressure. Your goal will be based on your health and your age. Lifestyle changes, such as eating healthy and being active, are always important to help lower blood pressure. You might also take medicine to reach your blood pressure goal.  Follow-up care is a key part of your treatment and safety. Be sure to make and go to all appointments, and call your doctor if you are having problems. It's also a good idea to know your test results and keep a list of the medicines you take. How can you care for yourself at home? Medical treatment  · If you stop taking your medicine, your blood pressure will go back up. You may take one or more types of medicine to lower your blood pressure. Be safe with medicines. Take your medicine exactly as prescribed. Call your doctor if you think you are having a problem with your medicine. · Talk to your doctor before you start taking aspirin every day. Aspirin can help certain people lower their risk of a heart attack or stroke. But taking aspirin isn't right for everyone, because it can cause serious bleeding. · See your doctor regularly. You may need to see the doctor more often at first or until your blood pressure comes down. · If you are taking blood pressure medicine, talk to your doctor before you take decongestants or anti-inflammatory medicine, such as ibuprofen.  Some of these medicines can raise blood pressure. · Learn how to check your blood pressure at home. Lifestyle changes  · Stay at a healthy weight. This is especially important if you put on weight around the waist. Losing even 10 pounds can help you lower your blood pressure. · If your doctor recommends it, get more exercise. Walking is a good choice. Bit by bit, increase the amount you walk every day. Try for at least 30 minutes on most days of the week. You also may want to swim, bike, or do other activities. · Avoid or limit alcohol. Talk to your doctor about whether you can drink any alcohol. · Try to limit how much sodium you eat to less than 2,300 milligrams (mg) a day. Your doctor may ask you to try to eat less than 1,500 mg a day. · Eat plenty of fruits (such as bananas and oranges), vegetables, legumes, whole grains, and low-fat dairy products. · Lower the amount of saturated fat in your diet. Saturated fat is found in animal products such as milk, cheese, and meat. Limiting these foods may help you lose weight and also lower your risk for heart disease. · Do not smoke. Smoking increases your risk for heart attack and stroke. If you need help quitting, talk to your doctor about stop-smoking programs and medicines. These can increase your chances of quitting for good. When should you call for help? Call 911 anytime you think you may need emergency care. This may mean having symptoms that suggest that your blood pressure is causing a serious heart or blood vessel problem. Your blood pressure may be over 180/120.   For example, call 911 if:    · You have symptoms of a heart attack. These may include:  ? Chest pain or pressure, or a strange feeling in the chest.  ? Sweating. ? Shortness of breath. ? Nausea or vomiting. ? Pain, pressure, or a strange feeling in the back, neck, jaw, or upper belly or in one or both shoulders or arms. ? Lightheadedness or sudden weakness.   ? A fast or irregular heartbeat.     · You have symptoms of a stroke. These may include:  ? Sudden numbness, tingling, weakness, or loss of movement in your face, arm, or leg, especially on only one side of your body. ? Sudden vision changes. ? Sudden trouble speaking. ? Sudden confusion or trouble understanding simple statements. ? Sudden problems with walking or balance. ? A sudden, severe headache that is different from past headaches.     · You have severe back or belly pain.    Do not wait until your blood pressure comes down on its own. Get help right away.   Call your doctor now or seek immediate care if:    · Your blood pressure is much higher than normal (such as 180/120 or higher), but you don't have symptoms.     · You think high blood pressure is causing symptoms, such as:  ? Severe headache.  ? Blurry vision.    Watch closely for changes in your health, and be sure to contact your doctor if:    · Your blood pressure measures higher than your doctor recommends at least 2 times. That means the top number is higher or the bottom number is higher, or both.     · You think you may be having side effects from your blood pressure medicine. Where can you learn more? Go to http://nicole-hebert.info/. Enter M934 in the search box to learn more about \"High Blood Pressure: Care Instructions. \"  Current as of: July 22, 2018  Content Version: 11.9  © 3324-2496 [employer]. Care instructions adapted under license by [employer] (which disclaims liability or warranty for this information). If you have questions about a medical condition or this instruction, always ask your healthcare professional. [place] [address] any warranty or liability for your use of this information.

## 2019-05-23 LAB
ALBUMIN SERPL-MCNC: 4.3 G/DL (ref 3.6–4.8)
ALBUMIN/GLOB SERPL: 2 {RATIO} (ref 1.2–2.2)
ALP SERPL-CCNC: 69 IU/L (ref 39–117)
ALT SERPL-CCNC: 38 IU/L (ref 0–32)
AST SERPL-CCNC: 22 IU/L (ref 0–40)
BILIRUB SERPL-MCNC: 0.4 MG/DL (ref 0–1.2)
BUN SERPL-MCNC: 14 MG/DL (ref 8–27)
BUN/CREAT SERPL: 25 (ref 12–28)
CALCIUM SERPL-MCNC: 10.6 MG/DL (ref 8.7–10.3)
CHLORIDE SERPL-SCNC: 102 MMOL/L (ref 96–106)
CO2 SERPL-SCNC: 26 MMOL/L (ref 20–29)
CREAT SERPL-MCNC: 0.57 MG/DL (ref 0.57–1)
GLOBULIN SER CALC-MCNC: 2.1 G/DL (ref 1.5–4.5)
GLUCOSE SERPL-MCNC: 87 MG/DL (ref 65–99)
POTASSIUM SERPL-SCNC: 3.7 MMOL/L (ref 3.5–5.2)
PROT SERPL-MCNC: 6.4 G/DL (ref 6–8.5)
SODIUM SERPL-SCNC: 144 MMOL/L (ref 134–144)
TSH SERPL DL<=0.005 MIU/L-ACNC: 1.04 UIU/ML (ref 0.45–4.5)

## 2019-05-23 NOTE — PROGRESS NOTES
Konrad Archer labs look great, your ALT is a liver enzyme that is only slightly up, but stable compared to previous check. No changes to your medication. We will see you for your follow-up in August or September. Please travel safe to [facility]!      Mike Chua NP

## 2019-07-31 DIAGNOSIS — J45.909 MODERATE ASTHMA WITHOUT COMPLICATION, UNSPECIFIED WHETHER PERSISTENT: ICD-10-CM

## 2019-07-31 RX ORDER — FLUTICASONE PROPIONATE 220 UG/1
AEROSOL, METERED RESPIRATORY (INHALATION)
Qty: 1 INHALER | Refills: 1 | Status: SHIPPED | OUTPATIENT
Start: 2019-07-31

## 2019-11-11 DIAGNOSIS — I10 ESSENTIAL HYPERTENSION: ICD-10-CM

## 2019-11-11 RX ORDER — OLMESARTAN MEDOXOMIL AND HYDROCHLOROTHIAZIDE 40/25 40; 25 MG/1; MG/1
TABLET ORAL
Qty: 90 TAB | Refills: 1 | Status: SHIPPED | OUTPATIENT
Start: 2019-11-11

## 2019-11-11 RX ORDER — OLMESARTAN MEDOXOMIL AND HYDROCHLOROTHIAZIDE 40/25 40; 25 MG/1; MG/1
TABLET ORAL
Qty: 30 TAB | Refills: 1 | Status: SHIPPED | OUTPATIENT
Start: 2019-11-11 | End: 2019-11-11 | Stop reason: SDUPTHER

## 2019-11-25 ENCOUNTER — HOSPITAL ENCOUNTER (OUTPATIENT)
Facility: CLINIC | Age: 64
Discharge: HOME | End: 2019-11-25
Attending: INTERNAL MEDICINE
Payer: MEDICARE

## 2019-11-25 VITALS
WEIGHT: 145 LBS | HEIGHT: 61 IN | BODY MASS INDEX: 27.38 KG/M2 | SYSTOLIC BLOOD PRESSURE: 122 MMHG | HEART RATE: 79 BPM | OXYGEN SATURATION: 96 % | DIASTOLIC BLOOD PRESSURE: 80 MMHG | TEMPERATURE: 98 F | RESPIRATION RATE: 18 BRPM

## 2019-11-25 DIAGNOSIS — J01.40 ACUTE PANSINUSITIS, RECURRENCE NOT SPECIFIED: Primary | ICD-10-CM

## 2019-11-25 PROCEDURE — 99203 OFFICE O/P NEW LOW 30 MIN: CPT | Performed by: NURSE PRACTITIONER

## 2019-11-25 RX ORDER — CEFDINIR 300 MG/1
300 CAPSULE ORAL 2 TIMES DAILY
Qty: 20 CAPSULE | Refills: 0 | Status: SHIPPED | OUTPATIENT
Start: 2019-11-25 | End: 2019-12-05

## 2019-11-25 RX ORDER — OLMESARTAN MEDOXOMIL AND HYDROCHLOROTHIAZIDE 40/25 40; 25 MG/1; MG/1
TABLET ORAL
Refills: 1 | COMMUNITY
Start: 2019-11-12 | End: 2022-02-28 | Stop reason: ALTCHOICE

## 2019-11-25 RX ORDER — FLUTICASONE PROPIONATE 220 UG/1
2 AEROSOL, METERED RESPIRATORY (INHALATION) AS NEEDED
Refills: 0 | COMMUNITY
Start: 2019-09-27 | End: 2023-01-12 | Stop reason: SDUPTHER

## 2019-11-25 ASSESSMENT — ENCOUNTER SYMPTOMS
FATIGUE: 1
MYALGIAS: 0
SINUS PRESSURE: 1
LIGHT-HEADEDNESS: 0
NAUSEA: 1
COUGH: 0
VOMITING: 0
FEVER: 0
SINUS PAIN: 1
VOICE CHANGE: 0
CHILLS: 0
SORE THROAT: 0
HEADACHES: 1
RHINORRHEA: 1
DIARRHEA: 0

## 2019-11-25 NOTE — ED PROVIDER NOTES
"HPI     Chief Complaint   Patient presents with   • Sinusitis       1 week of sinus headaches, green nasal dc, fatigue.  Not improving with daily flonase.  Denies fever.           Patient History     History reviewed. No pertinent past medical history.    History reviewed. No pertinent surgical history.    No family history on file.    Social History     Tobacco Use   • Smoking status: Not on file   Substance Use Topics   • Alcohol use: Not on file   • Drug use: Not on file       Systems Reviewed from Nursing Triage:  Allergies  Meds  Problems  Med Hx  Surg Hx          Review of Systems     Review of Systems   Constitutional: Positive for fatigue. Negative for chills and fever.   HENT: Positive for congestion, ear pain, rhinorrhea, sinus pressure and sinus pain. Negative for sore throat and voice change.    Respiratory: Negative for cough.    Gastrointestinal: Positive for nausea. Negative for diarrhea and vomiting.   Musculoskeletal: Negative for myalgias.   Skin: Negative for rash.   Neurological: Positive for headaches. Negative for light-headedness.        Physical Exam     ED Triage Vitals [11/25/19 1117]   Temp Heart Rate Resp BP SpO2   36.7 °C (98 °F) 79 18 122/80 96 %      Temp Source Heart Rate Source Patient Position BP Location FiO2 (%) (Set)   Oral Monitor Sitting Left upper arm --                     Patient Vitals for the past 24 hrs:   BP Temp Temp src Pulse Resp SpO2 Height Weight   11/25/19 1117 122/80 36.7 °C (98 °F) Oral 79 18 96 % 1.549 m (5' 1\") 65.8 kg (145 lb)                                       Physical Exam   Constitutional: She is oriented to person, place, and time. Vital signs are normal. She appears well-developed and well-nourished.  Non-toxic appearance. She appears ill.   HENT:   Head: Normocephalic and atraumatic.   Right Ear: A middle ear effusion is present.   Left Ear: A middle ear effusion is present.   Nose: Mucosal edema and rhinorrhea present. Right sinus exhibits " "maxillary sinus tenderness and frontal sinus tenderness. Left sinus exhibits maxillary sinus tenderness and frontal sinus tenderness.   Mouth/Throat: Mucous membranes are normal. Posterior oropharyngeal erythema present. Tonsils are 1+ on the right. Tonsils are 1+ on the left.   Eyes: EOM are normal.   Neck: Normal range of motion. Neck supple.   Cardiovascular: Normal rate and regular rhythm.   No murmur heard.  Pulmonary/Chest: Effort normal and breath sounds normal.   Lymphadenopathy:     She has cervical adenopathy.   Neurological: She is alert and oriented to person, place, and time.   Skin: Skin is warm and dry.   Psychiatric: She has a normal mood and affect. Her behavior is normal.   Vitals reviewed.           Procedures    ED Course & MDM     Labs Reviewed - No data to display    No orders to display               MDM  Number of Diagnoses or Management Options  Acute pansinusitis, recurrence not specified: new and requires workup  Diagnosis management comments:     Cefdinir bid x 10 days.  Pt reports taking cephalosporins in the past w/o issue, has PCN allergy.  Did not want Zpak or Doxycycline (\"they don't work as well for me\").  Continue flonase.  Add afrin.    Patient Progress  Patient progress: stable           Clinical Impressions as of Nov 25 1134   Acute pansinusitis, recurrence not specified        Neli Rausch CRNP  11/25/19 1134    "

## 2019-11-25 NOTE — ED ATTESTATION NOTE
I was immediately available to provide supervision and direction for the care of the patient.     Saleem Castillo,   11/25/19 1180

## 2019-11-27 ENCOUNTER — TELEPHONE (OUTPATIENT)
Dept: INTEGRATIVE MEDICINE | Age: 64
End: 2019-11-27

## 2019-11-27 NOTE — TELEPHONE ENCOUNTER
Referral service called, cristina wanted to set up an appointment.  Spoke with Cristina, she is aware that we do not except insurance at this time. She did not schedule.

## 2019-11-30 ENCOUNTER — TELEPHONE (OUTPATIENT)
Dept: URGENT CARE | Facility: CLINIC | Age: 64
End: 2019-11-30

## 2019-11-30 NOTE — TELEPHONE ENCOUNTER
The recently prescribed medication form [facility] has resulted in a rash. Would you like to see her or can you prescribe a new medication

## 2020-04-10 ENCOUNTER — PATIENT MESSAGE (OUTPATIENT)
Dept: PRIMARY CARE CLINIC | Age: 65
End: 2020-04-10

## 2020-05-07 DIAGNOSIS — I10 ESSENTIAL HYPERTENSION: ICD-10-CM

## 2020-05-07 RX ORDER — OLMESARTAN MEDOXOMIL AND HYDROCHLOROTHIAZIDE 40/25 40; 25 MG/1; MG/1
TABLET ORAL
Qty: 90 TAB | Refills: 1 | OUTPATIENT
Start: 2020-05-07

## 2020-10-08 ENCOUNTER — HOSPITAL ENCOUNTER (OUTPATIENT)
Facility: CLINIC | Age: 65
Discharge: HOME | End: 2020-10-08
Attending: INTERNAL MEDICINE
Payer: MEDICARE

## 2020-10-08 VITALS
WEIGHT: 144 LBS | DIASTOLIC BLOOD PRESSURE: 60 MMHG | HEIGHT: 60 IN | BODY MASS INDEX: 28.27 KG/M2 | HEART RATE: 92 BPM | OXYGEN SATURATION: 97 % | TEMPERATURE: 97.8 F | RESPIRATION RATE: 17 BRPM | SYSTOLIC BLOOD PRESSURE: 118 MMHG

## 2020-10-08 DIAGNOSIS — J01.00 ACUTE MAXILLARY SINUSITIS, RECURRENCE NOT SPECIFIED: Primary | ICD-10-CM

## 2020-10-08 PROCEDURE — 99213 OFFICE O/P EST LOW 20 MIN: CPT | Performed by: INTERNAL MEDICINE

## 2020-10-08 RX ORDER — DOXYCYCLINE 100 MG/1
100 CAPSULE ORAL 2 TIMES DAILY
Qty: 16 CAPSULE | Refills: 0 | Status: SHIPPED | OUTPATIENT
Start: 2020-10-08 | End: 2020-10-16

## 2020-10-08 ASSESSMENT — ENCOUNTER SYMPTOMS
FATIGUE: 1
SHORTNESS OF BREATH: 0
NAUSEA: 1
CHILLS: 1
FEVER: 1
COUGH: 0
VOMITING: 0
HEADACHES: 1
DIZZINESS: 1
DYSURIA: 0
RHINORRHEA: 1

## 2020-10-08 NOTE — ED PROVIDER NOTES
History  Chief Complaint   Patient presents with   • Sinusitis   • Earache / Otalgia     rt ear     Allergies; atenolol, lisinopril, ampicillin, cefdinir    + PCP [facility]    Taste and smell down since sinus surgery, but not worse.    No known covid exposure      History provided by:  Patient   used: No    Sinusitis  Pain details:     Location:  Retrobulbar    Quality:  Pressure    Duration:  9 days  Chronicity:  New  Relieved by: flonase, mucinex, sinus rinse.  Associated symptoms: chills, ear pain, fatigue, fever, headaches, nausea, rhinorrhea and sneezing    Associated symptoms: no cough, no shortness of breath and no vomiting    Risk factors: asthma    Risk factors: no CPAP use    Earache / Otalgia  Associated symptoms: fever, headaches and rhinorrhea    Associated symptoms: no cough, no rash and no vomiting        No past medical history on file.    No past surgical history on file.    No family history on file.    Social History     Tobacco Use   • Smoking status: Not on file   Substance Use Topics   • Alcohol use: Not on file   • Drug use: Not on file       Review of Systems   Constitutional: Positive for chills, fatigue and fever.   HENT: Positive for ear pain, rhinorrhea and sneezing.    Respiratory: Negative for cough and shortness of breath.    Gastrointestinal: Positive for nausea. Negative for vomiting.   Genitourinary: Negative for dysuria.   Skin: Negative for rash.   Neurological: Positive for dizziness and headaches.       Physical Exam  ED Triage Vitals [10/08/20 1425]   Temp Heart Rate Resp BP SpO2   36.6 °C (97.8 °F) 92 17 118/60 97 %      Temp Source Heart Rate Source Patient Position BP Location FiO2 (%) (Set)   Oral Monitor Sitting Left upper arm --       Physical Exam  Vitals signs reviewed.   Constitutional:       Appearance: She is not ill-appearing or diaphoretic.   HENT:      Right Ear: Tympanic membrane, ear canal and external ear normal. There is no impacted cerumen.       Left Ear: Tympanic membrane, ear canal and external ear normal. There is no impacted cerumen.      Nose: Rhinorrhea present.      Comments: Bilateral nasal mucosal edema with discharge, purulent.  Maxillary and frontal sinus percussion tenderness elicited as well.     Mouth/Throat:      Pharynx: Oropharynx is clear. No oropharyngeal exudate or posterior oropharyngeal erythema.      Comments: Absent tonsils.  Cardiovascular:      Rate and Rhythm: Normal rate and regular rhythm.      Heart sounds: No murmur.   Pulmonary:      Effort: Pulmonary effort is normal. No respiratory distress.      Breath sounds: No wheezing or rales.   Musculoskeletal:      Right lower leg: No edema.      Left lower leg: No edema.   Skin:     General: Skin is warm and dry.   Neurological:      General: No focal deficit present.      Mental Status: She is alert.      Cranial Nerves: No cranial nerve deficit.           Procedures  Procedures    [facility] Course  Clinical Impressions as of Oct 08 1439   Acute maxillary sinusitis, recurrence not specified       MDM  Number of Diagnoses or Management Options  Acute maxillary sinusitis, recurrence not specified:   Diagnosis management comments: Outside of the abdominal cramping she had earlier in the week that has resolved this does feel like a typical sinus infection that she has had several times since sinus surgery in [place] 10 years ago.  Patient will be started on doxycycline with instructions on how to take given.  Patient has an allergy to cefdinir which was a rash on her back.  Primary care follow-up can also be pursued as well if symptoms persist.                 Saleem Castillo,   10/08/20 1440

## 2020-10-08 NOTE — DISCHARGE INSTRUCTIONS
Please begin the medication that was prescribed and take as directed twice daily.  Please take the antibiotic with food and a full glass of water and remain upright for at least 30 minutes post administration.  Please also avoid excessive sunlight as this may cause skin irritation.  You can continue the Mucinex also with a full glass of water and please also continue your Flonase daily.  If symptoms do not resolve with the above treatment plan I would recommend you follow-up with ENT and Dr. Alverto Jenkins.  The office #[phone].  Please let the office staff know that you were a patient in the [facility] urgent care and [facility].

## 2022-02-28 ENCOUNTER — OFFICE VISIT (OUTPATIENT)
Dept: PRIMARY CARE | Facility: CLINIC | Age: 67
End: 2022-02-28
Payer: MEDICARE

## 2022-02-28 VITALS
DIASTOLIC BLOOD PRESSURE: 68 MMHG | WEIGHT: 145 LBS | OXYGEN SATURATION: 97 % | HEART RATE: 77 BPM | HEIGHT: 61 IN | TEMPERATURE: 98 F | RESPIRATION RATE: 14 BRPM | SYSTOLIC BLOOD PRESSURE: 122 MMHG | BODY MASS INDEX: 27.38 KG/M2

## 2022-02-28 DIAGNOSIS — E78.2 MIXED HYPERLIPIDEMIA: ICD-10-CM

## 2022-02-28 DIAGNOSIS — I10 PRIMARY HYPERTENSION: ICD-10-CM

## 2022-02-28 DIAGNOSIS — E55.9 VITAMIN D DEFICIENCY: ICD-10-CM

## 2022-02-28 DIAGNOSIS — A60.00 GENITAL HERPES SIMPLEX, UNSPECIFIED SITE: ICD-10-CM

## 2022-02-28 DIAGNOSIS — Z76.89 ENCOUNTER TO ESTABLISH CARE: Primary | ICD-10-CM

## 2022-02-28 DIAGNOSIS — E04.9 THYROID GOITER: ICD-10-CM

## 2022-02-28 DIAGNOSIS — Z12.11 COLON CANCER SCREENING: ICD-10-CM

## 2022-02-28 PROBLEM — J32.0 CHRONIC MAXILLARY SINUSITIS: Status: ACTIVE | Noted: 2020-11-13

## 2022-02-28 PROBLEM — M85.89 OSTEOPENIA OF MULTIPLE SITES: Status: ACTIVE | Noted: 2022-02-28

## 2022-02-28 PROBLEM — K21.9 GASTROESOPHAGEAL REFLUX DISEASE: Status: ACTIVE | Noted: 2020-11-13

## 2022-02-28 PROCEDURE — 99214 OFFICE O/P EST MOD 30 MIN: CPT | Performed by: NURSE PRACTITIONER

## 2022-02-28 PROCEDURE — G8752 SYS BP LESS 140: HCPCS | Performed by: NURSE PRACTITIONER

## 2022-02-28 PROCEDURE — G8754 DIAS BP LESS 90: HCPCS | Performed by: NURSE PRACTITIONER

## 2022-02-28 RX ORDER — ROSUVASTATIN CALCIUM 10 MG/1
10 TABLET, COATED ORAL
COMMUNITY
Start: 2022-01-24 | End: 2022-11-01 | Stop reason: SDUPTHER

## 2022-02-28 RX ORDER — OLMESARTAN MEDOXOMIL 40 MG/1
40 TABLET ORAL DAILY
COMMUNITY
Start: 2021-12-18 | End: 2022-06-26 | Stop reason: SDUPTHER

## 2022-02-28 RX ORDER — HYDROCHLOROTHIAZIDE 25 MG/1
25 TABLET ORAL DAILY
COMMUNITY
Start: 2021-12-18 | End: 2022-10-03 | Stop reason: SDUPTHER

## 2022-02-28 RX ORDER — FLUTICASONE PROPIONATE 50 MCG
1 SPRAY, SUSPENSION (ML) NASAL DAILY
COMMUNITY

## 2022-02-28 RX ORDER — ACYCLOVIR 400 MG/1
400 TABLET ORAL 3 TIMES DAILY
Qty: 30 TABLET | Refills: 0 | Status: SHIPPED | OUTPATIENT
Start: 2022-02-28 | End: 2022-03-14 | Stop reason: ALTCHOICE

## 2022-03-03 ENCOUNTER — APPOINTMENT (OUTPATIENT)
Dept: LAB | Age: 67
End: 2022-03-03
Attending: NURSE PRACTITIONER
Payer: MEDICARE

## 2022-03-03 DIAGNOSIS — E04.9 THYROID GOITER: ICD-10-CM

## 2022-03-03 DIAGNOSIS — E78.2 MIXED HYPERLIPIDEMIA: ICD-10-CM

## 2022-03-03 DIAGNOSIS — I10 PRIMARY HYPERTENSION: ICD-10-CM

## 2022-03-03 DIAGNOSIS — E55.9 VITAMIN D DEFICIENCY: ICD-10-CM

## 2022-03-03 LAB
ALBUMIN SERPL-MCNC: 3.9 G/DL (ref 3.4–5)
ALP SERPL-CCNC: 65 IU/L (ref 35–126)
ALT SERPL-CCNC: 35 IU/L (ref 11–54)
ANION GAP SERPL CALC-SCNC: 11 MEQ/L (ref 3–15)
AST SERPL-CCNC: 23 IU/L (ref 15–41)
BASOPHILS # BLD: 0.08 K/UL (ref 0.01–0.1)
BASOPHILS NFR BLD: 1 %
BILIRUB SERPL-MCNC: 0.4 MG/DL (ref 0.3–1.2)
BUN SERPL-MCNC: 8 MG/DL (ref 8–20)
CALCIUM SERPL-MCNC: 10.4 MG/DL (ref 8.9–10.3)
CHLORIDE SERPL-SCNC: 102 MEQ/L (ref 98–109)
CHOLEST SERPL-MCNC: 138 MG/DL
CO2 SERPL-SCNC: 29 MEQ/L (ref 22–32)
CREAT SERPL-MCNC: 0.7 MG/DL (ref 0.6–1.1)
DIFFERENTIAL METHOD BLD: ABNORMAL
EOSINOPHIL # BLD: 0.22 K/UL (ref 0.04–0.36)
EOSINOPHIL NFR BLD: 2.8 %
ERYTHROCYTE [DISTWIDTH] IN BLOOD BY AUTOMATED COUNT: 13.4 % (ref 11.7–14.4)
GFR SERPL CREATININE-BSD FRML MDRD: >60 ML/MIN/1.73M*2
GLUCOSE SERPL-MCNC: 92 MG/DL (ref 70–99)
HCT VFR BLDCO AUTO: 43.2 % (ref 35–45)
HDLC SERPL-MCNC: 59 MG/DL
HDLC SERPL: 2.3 {RATIO}
HGB BLD-MCNC: 13.7 G/DL (ref 11.8–15.7)
IMM GRANULOCYTES # BLD AUTO: 0.06 K/UL (ref 0–0.08)
IMM GRANULOCYTES NFR BLD AUTO: 0.8 %
LDLC SERPL CALC-MCNC: 61 MG/DL
LYMPHOCYTES # BLD: 2.53 K/UL (ref 1.2–3.5)
LYMPHOCYTES NFR BLD: 31.9 %
MCH RBC QN AUTO: 29.7 PG (ref 28–33.2)
MCHC RBC AUTO-ENTMCNC: 31.7 G/DL (ref 32.2–35.5)
MCV RBC AUTO: 93.7 FL (ref 83–98)
MONOCYTES # BLD: 0.48 K/UL (ref 0.28–0.8)
MONOCYTES NFR BLD: 6.1 %
NEUTROPHILS # BLD: 4.56 K/UL (ref 1.7–7)
NEUTS SEG NFR BLD: 57.4 %
NONHDLC SERPL-MCNC: 79 MG/DL
NRBC BLD-RTO: 0 %
PDW BLD AUTO: 10.7 FL (ref 9.4–12.3)
PLATELET # BLD AUTO: 300 K/UL (ref 150–369)
POTASSIUM SERPL-SCNC: 4.4 MEQ/L (ref 3.6–5.1)
PROT SERPL-MCNC: 6.2 G/DL (ref 6–8.2)
RBC # BLD AUTO: 4.61 M/UL (ref 3.93–5.22)
SODIUM SERPL-SCNC: 142 MEQ/L (ref 136–144)
T4 FREE SERPL-MCNC: 0.92 NG/DL (ref 0.58–1.64)
TRIGL SERPL-MCNC: 88 MG/DL (ref 30–149)
TSH SERPL DL<=0.05 MIU/L-ACNC: 1.95 MIU/L (ref 0.34–5.6)
WBC # BLD AUTO: 7.93 K/UL (ref 3.8–10.5)

## 2022-03-03 PROCEDURE — 36415 COLL VENOUS BLD VENIPUNCTURE: CPT

## 2022-03-03 PROCEDURE — 84439 ASSAY OF FREE THYROXINE: CPT

## 2022-03-03 PROCEDURE — 85025 COMPLETE CBC W/AUTO DIFF WBC: CPT

## 2022-03-03 PROCEDURE — 82306 VITAMIN D 25 HYDROXY: CPT

## 2022-03-03 PROCEDURE — 80061 LIPID PANEL: CPT

## 2022-03-03 PROCEDURE — 80053 COMPREHEN METABOLIC PANEL: CPT

## 2022-03-03 PROCEDURE — 84443 ASSAY THYROID STIM HORMONE: CPT

## 2022-03-03 PROCEDURE — G0328 FECAL BLOOD SCRN IMMUNOASSAY: HCPCS | Performed by: NURSE PRACTITIONER

## 2022-03-05 LAB — 25(OH)D3 SERPL-MCNC: 52 NG/ML (ref 30–100)

## 2022-03-10 ENCOUNTER — LAB REQUISITION (OUTPATIENT)
Dept: LAB | Facility: HOSPITAL | Age: 67
End: 2022-03-10
Attending: NURSE PRACTITIONER
Payer: MEDICARE

## 2022-03-10 DIAGNOSIS — Z12.11 ENCOUNTER FOR SCREENING FOR MALIGNANT NEOPLASM OF COLON: ICD-10-CM

## 2022-03-11 LAB — HEMOCCULT STL QL IA: NEGATIVE

## 2022-03-14 ENCOUNTER — OFFICE VISIT (OUTPATIENT)
Dept: PRIMARY CARE | Facility: CLINIC | Age: 67
End: 2022-03-14
Payer: MEDICARE

## 2022-03-14 ENCOUNTER — HOSPITAL ENCOUNTER (OUTPATIENT)
Dept: RADIOLOGY | Age: 67
Discharge: HOME | End: 2022-03-14
Attending: FAMILY MEDICINE
Payer: MEDICARE

## 2022-03-14 VITALS
WEIGHT: 145 LBS | SYSTOLIC BLOOD PRESSURE: 128 MMHG | HEIGHT: 61 IN | BODY MASS INDEX: 27.38 KG/M2 | OXYGEN SATURATION: 98 % | TEMPERATURE: 97.3 F | RESPIRATION RATE: 16 BRPM | HEART RATE: 80 BPM | DIASTOLIC BLOOD PRESSURE: 80 MMHG

## 2022-03-14 DIAGNOSIS — R10.12 LEFT UPPER QUADRANT ABDOMINAL PAIN: Primary | ICD-10-CM

## 2022-03-14 DIAGNOSIS — R10.9 LEFT FLANK PAIN: ICD-10-CM

## 2022-03-14 DIAGNOSIS — R10.32 LLQ PAIN: ICD-10-CM

## 2022-03-14 DIAGNOSIS — R10.12 LEFT UPPER QUADRANT ABDOMINAL PAIN: ICD-10-CM

## 2022-03-14 PROCEDURE — 74176 CT ABD & PELVIS W/O CONTRAST: CPT | Mod: ME

## 2022-03-14 PROCEDURE — 99214 OFFICE O/P EST MOD 30 MIN: CPT | Performed by: FAMILY MEDICINE

## 2022-03-14 PROCEDURE — G8752 SYS BP LESS 140: HCPCS | Performed by: FAMILY MEDICINE

## 2022-03-14 PROCEDURE — G8754 DIAS BP LESS 90: HCPCS | Performed by: FAMILY MEDICINE

## 2022-03-14 ASSESSMENT — PATIENT HEALTH QUESTIONNAIRE - PHQ9: SUM OF ALL RESPONSES TO PHQ9 QUESTIONS 1 & 2: 0

## 2022-03-14 ASSESSMENT — ENCOUNTER SYMPTOMS
ABDOMINAL PAIN: 1
FLANK PAIN: 1

## 2022-03-14 NOTE — PROGRESS NOTES
Daily Progress Note      Subjective      Patient ID: Cristina So is a 66 y.o. female.    HPI  2 weeks, l flank, rad to ruq, rlq  No po, bm, uo changes, issues  No f,c,v,n,d  No rash, overuse, etc    The following have been reviewed and updated as appropriate in this visit:   Problems       Review of Systems   Gastrointestinal: Positive for abdominal pain.   Genitourinary: Positive for flank pain.       Current Outpatient Medications   Medication Sig Dispense Refill   • cholecalciferol, vitamin D3, (VITAMIN D3) 100 mcg (4,000 unit) capsule Take 4,000 Units by mouth daily.     • FLOVENT  mcg/actuation inhaler Inhale 2 puffs as needed.    0   • fluticasone propionate (FLONASE) 50 mcg/actuation nasal spray Administer 1 spray into each nostril daily.     • folic acid/multivit-min/lutein (CENTRUM SILVER ORAL) Take by mouth.     • glucosamine/chondro jenkins A/C/Mn (GLUCOSAMINE-CHONDROITIN COMPLX ORAL) Take by mouth.     • hydrochlorothiazide (HYDRODIURIL) 25 mg tablet Take 25 mg by mouth daily.     • L.acid/B.bifidum/B.animal/FOS (PROBIOTIC COMPLEX ORAL) Take by mouth.     • olmesartan (BENICAR) 40 mg tablet Take 40 mg by mouth daily.     • rosuvastatin (CRESTOR) 10 mg tablet Take 10 mg by mouth once daily.     • TURMERIC ORAL Take by mouth.       No current facility-administered medications for this visit.     Past Medical History:   Diagnosis Date   • Allergies    • Arthritis    • Asthma    • Basal cell carcinoma    • Herpes genitalia    • Hypertension    • Lipid disorder      Family History   Problem Relation Age of Onset   • Lung cancer Biological Mother    • Hyperlipidemia Biological Father    • Hypertension Biological Father    • Heart disease Biological Father    • Asthma Biological Sister    • Heart disease Maternal Grandfather    • No Known Problems Biological Sister    • No Known Problems Biological Sister      Past Surgical History:   Procedure Laterality Date   •  SECTION     • CHOLECYSTECTOMY      • EYE SURGERY     • HYSTERECTOMY     • SINUS SURGERY     • TONSILLECTOMY     • UTERINE FIBROID EMBOLIZATION       Social History     Socioeconomic History   • Marital status:      Spouse name: Not on file   • Number of children: Not on file   • Years of education: Not on file   • Highest education level: Not on file   Occupational History   • Occupation: Retired   Tobacco Use   • Smoking status: Never Smoker   • Smokeless tobacco: Never Used   Vaping Use   • Vaping Use: Never used   Substance and Sexual Activity   • Alcohol use: Yes     Comment: Occasionally   • Drug use: Not Currently   • Sexual activity: Yes     Partners: Male     Birth control/protection: Female Sterilization/Tubes Tied   Other Topics Concern   • Not on file   Social History Narrative   • Not on file     Social Determinants of Health     Financial Resource Strain: Not on file   Food Insecurity: Not on file   Transportation Needs: Not on file   Physical Activity: Not on file   Stress: Not on file   Social Connections: Not on file   Intimate Partner Violence: Not on file   Housing Stability: Not on file     Allergies   Allergen Reactions   • Ampicillin Rash   • Atenolol      Can't remember reaction but it was not severe.   • Cefdinir Rash   • Lisinopril      Can't remember reaction but it was not severe.       Objective   No new labs.    Physical Exam  Abdominal:      Tenderness: There is abdominal tenderness in the left upper quadrant and left lower quadrant. There is no right CVA tenderness, left CVA tenderness, guarding or rebound. Negative signs include Vann's sign, McBurney's sign, psoas sign and obturator sign.         Assessment/Plan     Problem List Items Addressed This Visit     None      Visit Diagnoses     Left upper quadrant abdominal pain    -  Primary    Relevant Orders    CT ABDOMEN PELVIS WITHOUT IV CONTRAST        Orders Placed This Encounter   Procedures   • CT ABDOMEN PELVIS WITHOUT IV CONTRAST     Standing Status:    Future     Number of Occurrences:   1     Standing Expiration Date:   3/14/2023     Order Specific Question:   Should the patient receive oral contrast?     Answer:   No     Order Specific Question:   Release to patient     Answer:   Immediate     Reviewed,discussed, rx, dx, tx, hx  Will check ct  Will follow  Jason, hpt act mod  Suspect MS  Jason, hpt review, ice, heat, etc    Jed Hines, DO  3/14/2022

## 2022-03-18 ENCOUNTER — TELEPHONE (OUTPATIENT)
Dept: PRIMARY CARE | Facility: CLINIC | Age: 67
End: 2022-03-18
Payer: MEDICARE

## 2022-03-18 PROBLEM — E04.9 GOITER: Status: ACTIVE | Noted: 2018-02-26

## 2022-03-18 RX ORDER — LEVOFLOXACIN 500 MG/1
500 TABLET, FILM COATED ORAL DAILY
Qty: 7 TABLET | Refills: 0 | Status: SHIPPED | OUTPATIENT
Start: 2022-03-18 | End: 2022-03-25

## 2022-03-19 PROBLEM — J45.909 MODERATE ASTHMA WITHOUT COMPLICATION: Status: ACTIVE | Noted: 2018-02-26

## 2022-03-19 PROBLEM — I10 ESSENTIAL HYPERTENSION: Status: ACTIVE | Noted: 2018-02-26

## 2022-03-19 PROBLEM — G43.009 MIGRAINE WITHOUT AURA AND WITHOUT STATUS MIGRAINOSUS, NOT INTRACTABLE: Status: ACTIVE | Noted: 2018-02-26

## 2022-03-20 PROBLEM — Z88.9 MULTIPLE ALLERGIES: Status: ACTIVE | Noted: 2018-02-26

## 2022-04-13 ENCOUNTER — TELEPHONE (OUTPATIENT)
Dept: PRIMARY CARE | Facility: CLINIC | Age: 67
End: 2022-04-13

## 2022-04-13 ENCOUNTER — OFFICE VISIT (OUTPATIENT)
Dept: PRIMARY CARE | Facility: CLINIC | Age: 67
End: 2022-04-13
Payer: MEDICARE

## 2022-04-13 VITALS
DIASTOLIC BLOOD PRESSURE: 78 MMHG | RESPIRATION RATE: 16 BRPM | WEIGHT: 143 LBS | TEMPERATURE: 97.6 F | HEART RATE: 70 BPM | OXYGEN SATURATION: 98 % | BODY MASS INDEX: 27 KG/M2 | HEIGHT: 61 IN | SYSTOLIC BLOOD PRESSURE: 130 MMHG

## 2022-04-13 DIAGNOSIS — K57.92 DIVERTICULITIS: ICD-10-CM

## 2022-04-13 DIAGNOSIS — K52.9 COLITIS: Primary | ICD-10-CM

## 2022-04-13 PROCEDURE — G8754 DIAS BP LESS 90: HCPCS | Performed by: FAMILY MEDICINE

## 2022-04-13 PROCEDURE — G8752 SYS BP LESS 140: HCPCS | Performed by: FAMILY MEDICINE

## 2022-04-13 PROCEDURE — 99214 OFFICE O/P EST MOD 30 MIN: CPT | Performed by: FAMILY MEDICINE

## 2022-04-13 RX ORDER — METHYLPREDNISOLONE 4 MG/1
TABLET ORAL
Qty: 21 TABLET | Refills: 0 | Status: SHIPPED | OUTPATIENT
Start: 2022-04-13 | End: 2022-04-20

## 2022-04-13 RX ORDER — CIPROFLOXACIN 500 MG/1
500 TABLET ORAL 2 TIMES DAILY
Qty: 14 TABLET | Refills: 0 | Status: SHIPPED | OUTPATIENT
Start: 2022-04-13 | End: 2022-04-20

## 2022-04-13 RX ORDER — METRONIDAZOLE 500 MG/1
500 TABLET ORAL 3 TIMES DAILY
Qty: 21 TABLET | Refills: 0 | Status: SHIPPED | OUTPATIENT
Start: 2022-04-13 | End: 2022-04-20

## 2022-04-13 ASSESSMENT — ENCOUNTER SYMPTOMS
BACK PAIN: 0
SHORTNESS OF BREATH: 0
FLANK PAIN: 0
HEADACHES: 0
APPETITE CHANGE: 0
ARTHRALGIAS: 0
DECREASED CONCENTRATION: 0
FATIGUE: 0
NERVOUS/ANXIOUS: 0
TROUBLE SWALLOWING: 0
EYE PAIN: 0
ADENOPATHY: 0
SINUS PAIN: 0
SLEEP DISTURBANCE: 0
EYE DISCHARGE: 0
DIZZINESS: 0
ABDOMINAL PAIN: 1
DIFFICULTY URINATING: 0
PALPITATIONS: 0
CHEST TIGHTNESS: 0

## 2022-04-13 NOTE — TELEPHONE ENCOUNTER
At checkout patient asked if we received medical records.  Informed her we didn't. She stated she has been unable to get a hold of her old pcp's office.  I called office at [phone] and spoke with a rep. They informed me that her records request was sent to [facility].  I called [facility] at [phone] and spoke with Tessy. They didn't receive a records request for patient. She gave me a fax number where I could send the request again.  Faxed to [phone]. Confirmation received, fax sent successfully.  Patient informed of everything.  Copy of record request and confirmation given to Betty to scan in.

## 2022-04-13 NOTE — PROGRESS NOTES
Daily Progress Note      Subjective      Patient ID: Cristina So is a 66 y.o. female.    HPI  C/w sharp, localized llq pain  No po, uo, bm issue, c/o  No mass, dc, red, streaking    The following have been reviewed and updated as appropriate in this visit:   Problems         Review of Systems   Constitutional: Negative for appetite change and fatigue.   HENT: Negative for ear pain, sinus pain and trouble swallowing.    Eyes: Negative for pain and discharge.   Respiratory: Negative for chest tightness and shortness of breath.    Cardiovascular: Negative for chest pain and palpitations.   Gastrointestinal: Positive for abdominal pain.   Genitourinary: Negative for difficulty urinating, flank pain, menstrual problem and vaginal bleeding.   Musculoskeletal: Negative for arthralgias, back pain and gait problem.   Skin: Negative for rash.   Neurological: Negative for dizziness, syncope and headaches.   Hematological: Negative for adenopathy.   Psychiatric/Behavioral: Negative for decreased concentration and sleep disturbance. The patient is not nervous/anxious.        Current Outpatient Medications   Medication Sig Dispense Refill   • cholecalciferol, vitamin D3, (VITAMIN D3) 100 mcg (4,000 unit) capsule Take 4,000 Units by mouth daily.     • ciprofloxacin (CIPRO) 500 mg tablet Take 1 tablet (500 mg total) by mouth 2 (two) times a day for 7 days. 14 tablet 0   • FLOVENT  mcg/actuation inhaler Inhale 2 puffs as needed.    0   • fluticasone propionate (FLONASE) 50 mcg/actuation nasal spray Administer 1 spray into each nostril daily.     • folic acid/multivit-min/lutein (CENTRUM SILVER ORAL) Take by mouth.     • glucosamine/chondro jenkins A/C/Mn (GLUCOSAMINE-CHONDROITIN COMPLX ORAL) Take by mouth.     • hydrochlorothiazide (HYDRODIURIL) 25 mg tablet Take 25 mg by mouth daily.     • L.acid/B.bifidum/B.animal/FOS (PROBIOTIC COMPLEX ORAL) Take by mouth.     • methylPREDNISolone (MEDROL DOSEPACK) 4 mg tablet Follow  package directions. 21 tablet 0   • metroNIDAZOLE (FLAGYL) 500 mg tablet Take 1 tablet (500 mg total) by mouth 3 (three) times a day for 7 days. 21 tablet 0   • olmesartan (BENICAR) 40 mg tablet Take 40 mg by mouth daily.     • rosuvastatin (CRESTOR) 10 mg tablet Take 10 mg by mouth once daily.     • TURMERIC ORAL Take by mouth.       No current facility-administered medications for this visit.     Past Medical History:   Diagnosis Date   • Allergies    • Arthritis    • Asthma    • Basal cell carcinoma    • Herpes genitalia    • Hypertension    • Lipid disorder      Family History   Problem Relation Age of Onset   • Lung cancer Biological Mother    • Hyperlipidemia Biological Father    • Hypertension Biological Father    • Heart disease Biological Father    • Asthma Biological Sister    • Heart disease Maternal Grandfather    • No Known Problems Biological Sister    • No Known Problems Biological Sister      Past Surgical History:   Procedure Laterality Date   •  SECTION     • CHOLECYSTECTOMY     • EYE SURGERY     • HYSTERECTOMY     • SINUS SURGERY     • TONSILLECTOMY     • UTERINE FIBROID EMBOLIZATION       Social History     Socioeconomic History   • Marital status:      Spouse name: Not on file   • Number of children: Not on file   • Years of education: Not on file   • Highest education level: Not on file   Occupational History   • Occupation: Retired   Tobacco Use   • Smoking status: Never Smoker   • Smokeless tobacco: Never Used   Vaping Use   • Vaping Use: Never used   Substance and Sexual Activity   • Alcohol use: Yes     Comment: Occasionally   • Drug use: Not Currently   • Sexual activity: Yes     Partners: Male     Birth control/protection: Female Sterilization/Tubes Tied   Other Topics Concern   • Not on file   Social History Narrative   • Not on file     Social Determinants of Health     Financial Resource Strain: Not on file   Food Insecurity: Not on file   Transportation Needs: Not on  file   Physical Activity: Not on file   Stress: Not on file   Social Connections: Not on file   Intimate Partner Violence: Not on file   Housing Stability: Not on file     Allergies   Allergen Reactions   • Ampicillin Rash   • Atenolol      Can't remember reaction but it was not severe.   • Cefdinir Rash   • Lisinopril      Can't remember reaction but it was not severe.       Objective   I have reviewed the patient's pertinent labs. Pertinent labs are within normal limits.    Physical Exam  Abdominal:      Tenderness: There is abdominal tenderness in the left lower quadrant. There is guarding. There is no right CVA tenderness, left CVA tenderness or rebound. Negative signs include Vann's sign, Rovsing's sign, McBurney's sign, psoas sign and obturator sign.             Assessment/Plan     Problem List Items Addressed This Visit    None     Visit Diagnoses     Colitis    -  Primary    Diverticulitis            No orders of the defined types were placed in this encounter.    reviewed rx, dx, tx, hx, labs, scan  Refer for colo  ? Colitis  Will trial pred  ? Divert  Will change, re-do abx  [facility], divert protocol  Re-check 48 hrs      Jed Hines DO  4/13/2022

## 2022-05-25 ENCOUNTER — TELEPHONE (OUTPATIENT)
Dept: PRIMARY CARE | Facility: CLINIC | Age: 67
End: 2022-05-25
Payer: MEDICARE

## 2022-05-25 ENCOUNTER — OFFICE VISIT (OUTPATIENT)
Dept: PRIMARY CARE | Facility: CLINIC | Age: 67
End: 2022-05-25
Payer: MEDICARE

## 2022-05-25 VITALS
TEMPERATURE: 97.2 F | SYSTOLIC BLOOD PRESSURE: 128 MMHG | RESPIRATION RATE: 14 BRPM | DIASTOLIC BLOOD PRESSURE: 82 MMHG | HEART RATE: 89 BPM | OXYGEN SATURATION: 98 % | WEIGHT: 147 LBS | BODY MASS INDEX: 27.75 KG/M2 | HEIGHT: 61 IN

## 2022-05-25 DIAGNOSIS — J32.1 CHRONIC FRONTAL SINUSITIS: ICD-10-CM

## 2022-05-25 DIAGNOSIS — Z12.31 ENCOUNTER FOR SCREENING MAMMOGRAM FOR MALIGNANT NEOPLASM OF BREAST: Primary | ICD-10-CM

## 2022-05-25 DIAGNOSIS — R68.83 CHILLS: Primary | ICD-10-CM

## 2022-05-25 DIAGNOSIS — R63.0 LOSS OF APPETITE: ICD-10-CM

## 2022-05-25 DIAGNOSIS — R53.83 FATIGUE, UNSPECIFIED TYPE: ICD-10-CM

## 2022-05-25 LAB
EXPIRATION DATE: NORMAL
Lab: NORMAL
POCT MANUFACTURER: NORMAL
RAPID INFLUENZA A AGN: NEGATIVE
RAPID INFLUENZA B AGN: NEGATIVE

## 2022-05-25 PROCEDURE — 87637 SARSCOV2&INF A&B&RSV AMP PRB: CPT | Performed by: FAMILY MEDICINE

## 2022-05-25 PROCEDURE — G8752 SYS BP LESS 140: HCPCS | Performed by: FAMILY MEDICINE

## 2022-05-25 PROCEDURE — 99214 OFFICE O/P EST MOD 30 MIN: CPT | Performed by: FAMILY MEDICINE

## 2022-05-25 PROCEDURE — 87804 INFLUENZA ASSAY W/OPTIC: CPT | Mod: QW | Performed by: FAMILY MEDICINE

## 2022-05-25 PROCEDURE — G8754 DIAS BP LESS 90: HCPCS | Performed by: FAMILY MEDICINE

## 2022-05-25 RX ORDER — DOXYCYCLINE HYCLATE 100 MG
100 TABLET ORAL 2 TIMES DAILY
Qty: 14 TABLET | Refills: 0 | Status: SHIPPED | OUTPATIENT
Start: 2022-05-25 | End: 2022-06-01

## 2022-05-25 ASSESSMENT — ENCOUNTER SYMPTOMS
SINUS PRESSURE: 1
RHINORRHEA: 1
SINUS PAIN: 1
MYALGIAS: 1
COUGH: 1
ARTHRALGIAS: 1

## 2022-05-26 LAB
FLUAV RNA SPEC QL NAA+PROBE: NEGATIVE
FLUBV RNA SPEC QL NAA+PROBE: NEGATIVE
RSV RNA SPEC QL NAA+PROBE: NEGATIVE
SARS-COV-2 RNA RESP QL NAA+PROBE: NEGATIVE

## 2022-05-26 NOTE — PROGRESS NOTES
Daily Progress Note      Subjective      Patient ID: Cristina So is a 66 y.o. female.    HPI  Fatigue, chills, facial fullness, cough, congestion  As per prior notes, etc  S/p covid    The following have been reviewed and updated as appropriate in this visit:   Problems         Review of Systems   HENT: Positive for congestion, ear pain, postnasal drip, rhinorrhea, sinus pressure and sinus pain.    Respiratory: Positive for cough.    Musculoskeletal: Positive for arthralgias and myalgias.       Current Outpatient Medications   Medication Sig Dispense Refill   • cholecalciferol, vitamin D3, (VITAMIN D3) 100 mcg (4,000 unit) capsule Take 4,000 Units by mouth daily.     • doxycycline hyclate (VIBRA-TABS) 100 mg tablet Take 1 tablet (100 mg total) by mouth 2 (two) times a day for 7 days. 14 tablet 0   • FLOVENT  mcg/actuation inhaler Inhale 2 puffs as needed.    0   • fluticasone propionate (FLONASE) 50 mcg/actuation nasal spray Administer 1 spray into each nostril daily.     • folic acid/multivit-min/lutein (CENTRUM SILVER ORAL) Take by mouth.     • glucosamine/chondro jenkins A/C/Mn (GLUCOSAMINE-CHONDROITIN COMPLX ORAL) Take by mouth.     • hydrochlorothiazide (HYDRODIURIL) 25 mg tablet Take 25 mg by mouth daily.     • L.acid/B.bifidum/B.animal/FOS (PROBIOTIC COMPLEX ORAL) Take by mouth.     • olmesartan (BENICAR) 40 mg tablet Take 40 mg by mouth daily.     • rosuvastatin (CRESTOR) 10 mg tablet Take 10 mg by mouth once daily.     • TURMERIC ORAL Take by mouth.       No current facility-administered medications for this visit.     Past Medical History:   Diagnosis Date   • Allergies    • Arthritis    • Asthma    • Basal cell carcinoma    • Herpes genitalia    • Hypertension    • Lipid disorder      Family History   Problem Relation Age of Onset   • Lung cancer Biological Mother    • Hyperlipidemia Biological Father    • Hypertension Biological Father    • Heart disease Biological Father    • Asthma Biological  Sister    • Heart disease Maternal Grandfather    • No Known Problems Biological Sister    • No Known Problems Biological Sister      Past Surgical History:   Procedure Laterality Date   •  SECTION     • CHOLECYSTECTOMY     • EYE SURGERY     • HYSTERECTOMY     • SINUS SURGERY     • TONSILLECTOMY     • UTERINE FIBROID EMBOLIZATION       Social History     Socioeconomic History   • Marital status:      Spouse name: Not on file   • Number of children: Not on file   • Years of education: Not on file   • Highest education level: Not on file   Occupational History   • Occupation: Retired   Tobacco Use   • Smoking status: Never Smoker   • Smokeless tobacco: Never Used   Vaping Use   • Vaping Use: Never used   Substance and Sexual Activity   • Alcohol use: Yes     Comment: Occasionally   • Drug use: Not Currently   • Sexual activity: Yes     Partners: Male     Birth control/protection: Female Sterilization/Tubes Tied   Other Topics Concern   • Not on file   Social History Narrative   • Not on file     Social Determinants of Health     Financial Resource Strain: Not on file   Food Insecurity: Not on file   Transportation Needs: Not on file   Physical Activity: Not on file   Stress: Not on file   Social Connections: Not on file   Intimate Partner Violence: Not on file   Housing Stability: Not on file     Allergies   Allergen Reactions   • Ampicillin Rash   • Atenolol      Can't remember reaction but it was not severe.   • Cefdinir Rash   • Lisinopril      Can't remember reaction but it was not severe.       Objective   No new labs.    Physical Exam  Constitutional:       Appearance: Normal appearance. She is well-developed.   HENT:      Head: Normocephalic and atraumatic.   Eyes:      General: Lids are normal.      Conjunctiva/sclera: Conjunctivae normal.      Pupils: Pupils are equal, round, and reactive to light.   Neck:      Trachea: Trachea normal.   Cardiovascular:      Rate and Rhythm: Normal rate and  "regular rhythm.      Heart sounds: Normal heart sounds.   Pulmonary:      Effort: Pulmonary effort is normal.      Breath sounds: Normal breath sounds. No wheezing.   Abdominal:      General: Bowel sounds are normal.      Palpations: Abdomen is soft. There is no mass.      Tenderness: There is no abdominal tenderness. There is no guarding or rebound.   Musculoskeletal:         General: Normal range of motion.      Cervical back: Full passive range of motion without pain and normal range of motion.   Lymphadenopathy:      Cervical: No cervical adenopathy.   Skin:     General: Skin is warm and dry.   Neurological:      Mental Status: She is alert and oriented to person, place, and time.   Psychiatric:         Speech: Speech normal.         Behavior: Behavior normal.         Thought Content: Thought content normal.         Judgment: Judgment normal.         Assessment/Plan     Problem List Items Addressed This Visit    None     Visit Diagnoses     Chills    -  Primary    Relevant Orders    POCT Influenza A/B (Completed)    COVID- 19 PCR Symptomatic (includes FLU A/B & RSV) - [facility]    Fatigue, unspecified type        Relevant Orders    POCT Influenza A/B (Completed)    COVID- 19 PCR Symptomatic (includes FLU A/B & RSV) - [facility]    Loss of appetite        Relevant Orders    POCT Influenza A/B (Completed)    COVID- 19 PCR Symptomatic (includes FLU A/B & RSV) - [facility]    Chronic frontal sinusitis            Orders Placed This Encounter   Procedures   • COVID- 19 PCR Symptomatic (includes FLU A/B & RSV) - [facility]     Standing Status:   Future     Number of Occurrences:   1     Standing Expiration Date:   5/25/2023     Order Specific Question:   Reason for testing:     Answer:   Symptomatic/COVID Suspected     Order Specific Question:   COVID-19 PUI? (Always theodore \"yes\" regardless of resulting lab)     Answer:   Yes     Order Specific Question:   Symptomatic for COVID-19 as defined by CDC?     Answer:   Yes     Order " Specific Question:   Date of Symptom Onset     Answer:   5/20/2022     Order Specific Question:   Hospitalized for COVID-19?     Answer:   No     Order Specific Question:   Admitted to ICU for COVID-19?     Answer:   No     Order Specific Question:   Employed in healthcare setting?     Answer:   No     Order Specific Question:   Resident in a congregate (group) care setting?     Answer:   No     Order Specific Question:   Release to patient     Answer:   Immediate     Order Specific Question:   Source of Specimen(s)     Answer:   Nose [27]   • POCT Influenza A/B     Order Specific Question:   Release to patient     Answer:   Immediate     Uri protocol  covid protocol  Will doxy,  Check covid, flu      Jed Hines,   5/25/2022

## 2022-05-31 RX ORDER — BENZONATATE 200 MG/1
200 CAPSULE ORAL 3 TIMES DAILY PRN
Qty: 30 CAPSULE | Refills: 0 | Status: SHIPPED | OUTPATIENT
Start: 2022-05-31 | End: 2022-06-10

## 2022-05-31 RX ORDER — PREDNISONE 10 MG/1
10 TABLET ORAL 4 TIMES DAILY
Qty: 16 TABLET | Refills: 0 | Status: SHIPPED | OUTPATIENT
Start: 2022-05-31 | End: 2023-01-12 | Stop reason: ALTCHOICE

## 2022-06-06 ENCOUNTER — HOSPITAL ENCOUNTER (OUTPATIENT)
Dept: RADIOLOGY | Age: 67
Discharge: HOME | End: 2022-06-06
Attending: FAMILY MEDICINE
Payer: MEDICARE

## 2022-06-06 ENCOUNTER — APPOINTMENT (OUTPATIENT)
Dept: RADIOLOGY | Age: 67
End: 2022-06-06
Payer: MEDICARE

## 2022-06-06 DIAGNOSIS — Z12.31 ENCOUNTER FOR SCREENING MAMMOGRAM FOR MALIGNANT NEOPLASM OF BREAST: ICD-10-CM

## 2022-06-06 PROCEDURE — 77063 BREAST TOMOSYNTHESIS BI: CPT

## 2022-06-08 ENCOUNTER — TRANSCRIBE ORDERS (OUTPATIENT)
Dept: SCHEDULING | Age: 67
End: 2022-06-08

## 2022-06-08 DIAGNOSIS — J32.0 CHRONIC MAXILLARY SINUSITIS: Primary | ICD-10-CM

## 2022-06-09 ENCOUNTER — HOSPITAL ENCOUNTER (OUTPATIENT)
Dept: RADIOLOGY | Age: 67
Discharge: HOME | End: 2022-06-09
Attending: OTOLARYNGOLOGY
Payer: MEDICARE

## 2022-06-09 DIAGNOSIS — J32.0 CHRONIC MAXILLARY SINUSITIS: ICD-10-CM

## 2022-06-09 PROCEDURE — 70486 CT MAXILLOFACIAL W/O DYE: CPT

## 2022-06-26 RX ORDER — OLMESARTAN MEDOXOMIL 40 MG/1
40 TABLET ORAL DAILY
Qty: 90 TABLET | Refills: 3 | Status: SHIPPED | OUTPATIENT
Start: 2022-06-26 | End: 2023-06-28 | Stop reason: SDUPTHER

## 2022-08-02 RX ORDER — OMEPRAZOLE 20 MG/1
20 CAPSULE, DELAYED RELEASE ORAL
Qty: 90 CAPSULE | Refills: 1 | Status: SHIPPED | OUTPATIENT
Start: 2022-08-02 | End: 2022-10-30 | Stop reason: SDUPTHER

## 2022-10-03 RX ORDER — HYDROCHLOROTHIAZIDE 25 MG/1
25 TABLET ORAL DAILY
Qty: 90 TABLET | Refills: 3 | Status: SHIPPED | OUTPATIENT
Start: 2022-10-03 | End: 2023-09-19

## 2022-10-30 RX ORDER — OMEPRAZOLE 20 MG/1
20 CAPSULE, DELAYED RELEASE ORAL
Qty: 90 CAPSULE | Refills: 1 | Status: SHIPPED | OUTPATIENT
Start: 2022-10-30 | End: 2023-05-02

## 2022-11-01 RX ORDER — ROSUVASTATIN CALCIUM 10 MG/1
10 TABLET, COATED ORAL
Qty: 90 TABLET | Refills: 3 | Status: SHIPPED | OUTPATIENT
Start: 2022-11-01 | End: 2022-11-02 | Stop reason: SDUPTHER

## 2022-11-02 RX ORDER — ROSUVASTATIN CALCIUM 10 MG/1
10 TABLET, COATED ORAL
Qty: 90 TABLET | Refills: 3 | Status: SHIPPED | OUTPATIENT
Start: 2022-11-02 | End: 2023-10-18

## 2022-11-02 NOTE — TELEPHONE ENCOUNTER
Medicine Refill Request    Last Office: 5/25/2022   Last Consult Visit: Visit date not found  Last Telemedicine Visit: Visit date not found    Next Appointment: Visit date not found      Current Outpatient Medications:     cholecalciferol, vitamin D3, (VITAMIN D3) 100 mcg (4,000 unit) capsule, Take 4,000 Units by mouth daily., Disp: , Rfl:     FLOVENT  mcg/actuation inhaler, Inhale 2 puffs as needed.  , Disp: , Rfl: 0    fluticasone propionate (FLONASE) 50 mcg/actuation nasal spray, Administer 1 spray into each nostril daily., Disp: , Rfl:     folic acid/multivit-min/lutein (CENTRUM SILVER ORAL), Take by mouth., Disp: , Rfl:     glucosamine/chondro jenkins A/C/Mn (GLUCOSAMINE-CHONDROITIN COMPLX ORAL), Take by mouth., Disp: , Rfl:     hydrochlorothiazide (HYDRODIURIL) 25 mg tablet, Take 1 tablet (25 mg total) by mouth daily., Disp: 90 tablet, Rfl: 3    L.acid/B.bifidum/B.animal/FOS (PROBIOTIC COMPLEX ORAL), Take by mouth., Disp: , Rfl:     olmesartan (BENICAR) 40 mg tablet, Take 1 tablet (40 mg total) by mouth daily., Disp: 90 tablet, Rfl: 3    omeprazole (PriLOSEC) 20 mg capsule, Take 1 capsule (20 mg total) by mouth daily before breakfast., Disp: 90 capsule, Rfl: 1    predniSONE (DELTASONE) 10 mg tablet, Take 1 tablet (10 mg total) by mouth 4 (four) times a day for 4 days., Disp: 16 tablet, Rfl: 0    rosuvastatin (CRESTOR) 10 mg tablet, Take 1 tablet (10 mg total) by mouth once daily., Disp: 90 tablet, Rfl: 3    TURMERIC ORAL, Take by mouth., Disp: , Rfl:       BP Readings from Last 3 Encounters:   05/25/22 128/82   04/13/22 130/78   03/14/22 128/80       Recent Lab results:  Lab Results   Component Value Date    CHOL 138 03/03/2022   ,   Lab Results   Component Value Date    HDL 59 03/03/2022   ,   Lab Results   Component Value Date    LDLCALC 61 03/03/2022   ,   Lab Results   Component Value Date    TRIG 88 03/03/2022        Lab Results   Component Value Date    GLUCOSE 92 03/03/2022   , No results  found for: HGBA1C      Lab Results   Component Value Date    CREATININE 0.7 03/03/2022       Lab Results   Component Value Date    TSH 1.95 03/03/2022

## 2023-01-12 ENCOUNTER — OFFICE VISIT (OUTPATIENT)
Dept: PRIMARY CARE | Facility: CLINIC | Age: 68
End: 2023-01-12
Payer: MEDICARE

## 2023-01-12 VITALS
DIASTOLIC BLOOD PRESSURE: 88 MMHG | OXYGEN SATURATION: 98 % | HEART RATE: 87 BPM | WEIGHT: 155 LBS | SYSTOLIC BLOOD PRESSURE: 140 MMHG | HEIGHT: 61 IN | BODY MASS INDEX: 29.27 KG/M2 | TEMPERATURE: 97.5 F

## 2023-01-12 DIAGNOSIS — J45.40 MODERATE PERSISTENT ASTHMA WITHOUT COMPLICATION: Primary | ICD-10-CM

## 2023-01-12 DIAGNOSIS — Z78.0 POST-MENOPAUSAL: ICD-10-CM

## 2023-01-12 PROCEDURE — G8754 DIAS BP LESS 90: HCPCS | Performed by: NURSE PRACTITIONER

## 2023-01-12 PROCEDURE — 99213 OFFICE O/P EST LOW 20 MIN: CPT | Performed by: NURSE PRACTITIONER

## 2023-01-12 PROCEDURE — G8753 SYS BP > OR = 140: HCPCS | Performed by: NURSE PRACTITIONER

## 2023-01-12 RX ORDER — VIT C/E/ZN/COPPR/LUTEIN/ZEAXAN 250MG-90MG
CAPSULE ORAL
COMMUNITY
Start: 2022-10-01

## 2023-01-12 RX ORDER — ALBUTEROL SULFATE 90 UG/1
2 INHALANT RESPIRATORY (INHALATION) EVERY 6 HOURS PRN
COMMUNITY
End: 2023-01-12 | Stop reason: SDUPTHER

## 2023-01-12 NOTE — PROGRESS NOTES
"  Subjective     Patient ID: Cristina So is a 67 y.o. female.    HPI  Chronic sinusitis, saw ENT, started with acupuncture, chiro, massage and energy healer. Having improvement and less sinus infections.     She reports that 3 weeks ago was treated with acupuncture and herbs for sinus pressure symptoms.     Albuterol as needed but chest heaviness continues.       Review of Systems   Respiratory: Positive for chest tightness.        Objective     Vitals:    01/12/23 1024   BP: 140/88   Pulse: 87   Temp: 36.4 °C (97.5 °F)   SpO2: 98%   Weight: 70.3 kg (155 lb)   Height: 1.537 m (5' 0.5\")     Body mass index is 29.77 kg/m².    Physical Exam  Vitals reviewed.   Constitutional:       Appearance: Normal appearance.   Cardiovascular:      Rate and Rhythm: Normal rate and regular rhythm.      Heart sounds: Normal heart sounds.   Pulmonary:      Effort: Pulmonary effort is normal.      Breath sounds: Normal breath sounds.   Musculoskeletal:         General: Normal range of motion.   Skin:     General: Skin is warm and dry.   Neurological:      Mental Status: She is alert and oriented to person, place, and time.         Assessment/Plan   Diagnoses and all orders for this visit:    Moderate persistent asthma without complication (Primary)  She is unsure if she has been using albuterol or Flovent at home. She will check home inhaler and update on portal. Plan is for LABA BID and SHITAL prn.     Post-menopausal  Due for Dexa, script provided.   -     DEXA BONE DENSITY; Future    All questions and concerns have been addressed. Patient will contact the office if symptoms fail to improve or worsen.       MED [facility]            "

## 2023-01-13 ASSESSMENT — ENCOUNTER SYMPTOMS: CHEST TIGHTNESS: 1

## 2023-02-03 ENCOUNTER — HOSPITAL ENCOUNTER (OUTPATIENT)
Dept: RADIOLOGY | Age: 68
Discharge: HOME | End: 2023-02-03
Attending: NURSE PRACTITIONER
Payer: MEDICARE

## 2023-02-03 DIAGNOSIS — Z78.0 POST-MENOPAUSAL: ICD-10-CM

## 2023-02-03 PROCEDURE — 77080 DXA BONE DENSITY AXIAL: CPT

## 2023-04-13 ENCOUNTER — OFFICE VISIT (OUTPATIENT)
Dept: PRIMARY CARE | Facility: CLINIC | Age: 68
End: 2023-04-13
Payer: MEDICARE

## 2023-04-13 VITALS
SYSTOLIC BLOOD PRESSURE: 128 MMHG | RESPIRATION RATE: 16 BRPM | WEIGHT: 147 LBS | OXYGEN SATURATION: 99 % | HEART RATE: 73 BPM | HEIGHT: 63 IN | DIASTOLIC BLOOD PRESSURE: 76 MMHG | TEMPERATURE: 97.3 F | BODY MASS INDEX: 26.05 KG/M2

## 2023-04-13 DIAGNOSIS — Z00.00 ENCOUNTER FOR MEDICARE ANNUAL WELLNESS EXAM: Primary | ICD-10-CM

## 2023-04-13 DIAGNOSIS — I10 PRIMARY HYPERTENSION: ICD-10-CM

## 2023-04-13 DIAGNOSIS — K21.9 GASTROESOPHAGEAL REFLUX DISEASE WITHOUT ESOPHAGITIS: ICD-10-CM

## 2023-04-13 DIAGNOSIS — M85.89 OSTEOPENIA OF MULTIPLE SITES: ICD-10-CM

## 2023-04-13 DIAGNOSIS — E04.9 THYROID GOITER: ICD-10-CM

## 2023-04-13 DIAGNOSIS — Z12.11 COLON CANCER SCREENING: ICD-10-CM

## 2023-04-13 DIAGNOSIS — J45.40 MODERATE PERSISTENT ASTHMA WITHOUT COMPLICATION: ICD-10-CM

## 2023-04-13 DIAGNOSIS — E53.8 B12 DEFICIENCY: ICD-10-CM

## 2023-04-13 DIAGNOSIS — E55.9 VITAMIN D DEFICIENCY: ICD-10-CM

## 2023-04-13 PROBLEM — H61.20 IMPACTED CERUMEN: Status: RESOLVED | Noted: 2022-06-07 | Resolved: 2023-04-13

## 2023-04-13 PROBLEM — J32.0 CHRONIC MAXILLARY SINUSITIS: Status: RESOLVED | Noted: 2020-11-13 | Resolved: 2023-04-13

## 2023-04-13 PROBLEM — H92.01 OTALGIA, RIGHT EAR: Status: ACTIVE | Noted: 2022-07-14

## 2023-04-13 PROBLEM — H90.3 SENSORINEURAL HEARING LOSS, BILATERAL: Status: ACTIVE | Noted: 2022-06-07

## 2023-04-13 PROBLEM — H92.01 OTALGIA, RIGHT EAR: Status: RESOLVED | Noted: 2022-07-14 | Resolved: 2023-04-13

## 2023-04-13 PROBLEM — H61.20 IMPACTED CERUMEN: Status: ACTIVE | Noted: 2022-06-07

## 2023-04-13 PROCEDURE — G0438 PPPS, INITIAL VISIT: HCPCS | Performed by: NURSE PRACTITIONER

## 2023-04-13 ASSESSMENT — MINI COG
COMPLETED: YES
TOTAL SCORE: 5

## 2023-04-13 ASSESSMENT — PATIENT HEALTH QUESTIONNAIRE - PHQ9: SUM OF ALL RESPONSES TO PHQ9 QUESTIONS 1 & 2: 0

## 2023-04-13 NOTE — PATIENT INSTRUCTIONS
Your Personalized Prevention Plan Services (PPPS)    Preventive Services Checklist (Assumes Average Risk Unless Otherwise Noted):    Health Maintenance Topics with due status: Overdue       Topic Date Due    Colorectal Cancer Screening 03/03/2023     Health Maintenance Topics with due status: Postponed       Topic Postponed Until    DTaP, Tdap, and Td Vaccines 01/18/2024 (Originally 7/6/1974)     Health Maintenance Topics with due status: Not Due       Topic Last Completion Date    Breast Cancer Screening 06/06/2022    DEXA Scan 02/03/2023    Falls Risk Screening 04/08/2023    Medicare Annual Wellness Visit 04/13/2023    Depression Screening 04/13/2023     Health Maintenance Topics with due status: Completed       Topic Last Completion Date    Pneumococcal (65 years and older) 12/04/2019    Zoster Vaccine 11/05/2020    COVID-19 Vaccine 10/20/2021    Influenza Vaccine 09/26/2022     Health Maintenance Topics with due status: Aged Out       Topic Date Due    Meningococcal ACWY Aged Out    HIB Vaccines Aged Out    Hepatitis B Vaccines Aged Out    IPV Vaccines Aged Out    HPV Vaccines Aged Out     Health Maintenance Topics with due status: Discontinued       Topic Date Due    Hepatitis C Screening Discontinued       You May Be Eligible for These Additional Preventive Services   (Assumes Average Risk Unless Otherwise Noted)  Diabetes Screening Any 1 risk factor: hypertension, dyslipidemia, obesity, high glucose; or Any 2 risk factors: >=66yo, overweight, family history diabetes (covered every 6 months)   Hepatitis C Screening Any 1 risk factor: 1) blood transfusion before 1992,   2) current or past injection drug use (annually for high risk; if born between 8339-4[birth year], see above for status).   Vaccine: Hepatitis B As necessary if at-risk: hemophilia, ESRD, diabetes, living with individual infected with hep B, healthcare worker with frequent contact with blood/bodily fluids (series covered once)    Sexually Transmitted Diseases (STDs) As necessary chlamydia, gonorrhea, syphilis, hepatitis B (covered annually)  HIV if any 1 risk factor present: 1) <14yo or >64yo and at increased risk or 2) 15-64yo and ask for it (covered annually)   Lung Cancer Screening Low dose chest CT if all three risk factors: 1) 50-76yo, 2) smoker or quit within last 15y, 3) >=20 pack years (covered annually).  No results found for this or any previous visit.       Cholesterol Screening Both risk factors: 1) >=21yo and 2)  increased risk coronary artery disease (covered every 5 years).     Breast Cancer Screening Covered once 35-40yo, annually >=41yo (if >=51yo, see above for status).         Health Risk Factors with Personalized Education:  ----------------------------------------------------------------------------------------------------------------------  Controlling Your Blood Pressure  · Maintain a normal weight (body mass index between 18.5 and 24.9).  · Eat more fruit, vegetables and low-fat dairy.  · Eat less saturated fat and total fat.  · Lower your sodium (salt) intake.  Try to stay under 1500 mg per day, but if you cannot get your intake to be that low, at least lower it by 1000 mg.  · Stay active.  Try to get at least 90 to 150 minutes of exercise per week.  Try brisk walking, swimming, bicycling or dancing.  · Limit alcohol intake.  When you do consume alcohol, drink no more than 1 drink per day.  · If you have been prescribed medication, take it regularly and exactly as prescribed.  Let your PCP know if you have any problems or questions about your medication.  · Check your blood pressure at home or at the store.  Write down your readings and share them with your PCP  ----------------------------------------------------------------------------------------------------------------------  Controlling Your Cholesterol  · Reduce the amount of saturated and trans fat in your diet.  Limit intake of red meat.  Consume only  low-fat or non-fat/skim dairy.  Limit fried food.  Cook with vegetable oils.  · Reduce your intake of sugary foods, sugary drinks and alcohol.  · Eat a diet high in fruit, vegetables and whole grains.  · Get protein from fish, poultry and a small portion of nuts.  · Stay active.  Try to get at least 90 to 150 minutes of exercise per week.  Try brisk walking, swimming, bicycling or dancing.  · Maintain a healthy weight by balancing your diet and exercise.  · If you have been prescribed medication, take it regularly and exactly as prescribed. Let your PCP know if you have any problems or questions about your medication.  · It’s important to know your cholesterol numbers.  When recommended by your PCP, get the cholesterol blood test.  ----------------------------------------------------------------------------------------------------------------------  Controlling Your Osteopenia, Strengthening Your Bones  · Try to get at least 90 to 150 minutes of weight-bearing exercise per week.  · Ensure intake of at least 1200mg of calcium per day.  Eat foods high in calcium like milk and other dairy, green vegetables, fruit, canned fish with soft and edible bones, nuts, calcium-set tofu.  Some foods are calcium-fortified, like bread, cereal, fruit juices and mineral water.  · Help your body make vitamin D by getting 10-15 minutes per day of sunlight.    · Ensure intake of at least 600IU of vitamin D per day.  Eat foods high in vitamin D like oily fish (salmon, sardines, mackerel) and eggs.  Some foods are fortified with vitamin D, like dairy and cereals.  · Avoid high amounts of caffeine and salt, since they can cause the body to loose calcium.  · Limit alcohol intake, since it is associated with weaker bones and is associated with falls and fractures.  · Limit intake of fizzy drinks.  · If you have been prescribed medication, take it regularly and exactly as prescribed.  Let your PCP know if you have any problems or questions  about your medication  ----------------------------------------------------------------------------------------------------------------------  Reducing Your Risk of Falls  · Tell your PCP if any of your medications make you feel tired, dizzy, lightheaded or off-balance.  · Maintain coordination, flexibility and balance by ensuring regular physical activity.  · Limit alcohol intake to 1 drink per day.  Consider avoiding all alcohol intake.  · Ensure good vision.  Visit an ophthalmologist or optometrist regularly for vision screening or to make sure your glasses / contact lens prescription is correct.  If you need glasses or contacts, wear them.  When you get new glasses or contacts, take time to get used to them.  Do not wear sunglasses or tinted lenses when indoors.  · Ensure good hearing.  Have your hearing checked if you are having trouble hearing, or family and friends think you cannot hear them.  If you need a hearing aid, be sure it fits well and wear it.  · Get enough rest.  Ensure about 7-9 hours of sleep every day.  · Get up slowly from your bed or chairs.  Do not start walking until you are sure you feel steady.  · Wear non-skid, rubber-soled, low-heeled shoes.  Do not walk in socks, or in shoes and slippers with smooth soles.  · If your PCP or therapist recommends using a cane or walker, use it regularly.  · Make your home safer.  Increase lighting throughout the house, especially at the top and bottom of stairs.  Ensure lighting is easily turned on when getting up in the middle of the night.  Make sure there are two secure rails on all stairs.  Install grab bars in the bathtub / shower and near the toilet.  Consider using a shower chair and / or a hand-held shower.  · Spread sand or salt on icy surfaces.  Beware of wet surfaces, which can be icy.  · Tell your PCP if you have fallen.

## 2023-04-13 NOTE — PROGRESS NOTES
Subjective     Cristina So is a 67 y.o. female who presents for an initial annual wellness visit.   Reports that she feels well, things are going well.Rash on neck 2-3 week on and off 3 times over the past year. No changes to topicals, detergents. + Itchy rash.   Parttime work at [employer], physical active at work.     Patient Care Team:  Jed Hines DO as PCP - General (Family Medicine)  Mirtha Lloyd CRNP as Nurse Practitioner (Family Medicine)  Angelic Leal MD as Referring Physician (Dermatology)    Comprehensive Medical and Social History  Patient Active Problem List   Diagnosis   • Gastroesophageal reflux disease   • Thyroid goiter   • Primary hypertension   • Osteopenia of multiple sites   • Asthma   • Sensorineural hearing loss, bilateral     Past Medical History:   Diagnosis Date   • Allergies    • Arthritis    • Asthma    • Basal cell carcinoma    • Chronic maxillary sinusitis 2020   • Herpes genitalia    • Hypertension    • Lipid disorder      Past Surgical History:   Procedure Laterality Date   •  SECTION     • CHOLECYSTECTOMY     • EYE SURGERY     • HYSTERECTOMY     • SINUS SURGERY     • TONSILLECTOMY     • UTERINE FIBROID EMBOLIZATION       Allergies   Allergen Reactions   • Ampicillin Rash   • Atenolol      Can't remember reaction but it was not severe.   • Cefdinir Rash   • Lisinopril      Can't remember reaction but it was not severe.     Current Outpatient Medications   Medication Sig Dispense Refill   • albuterol HFA (VENTOLIN HFA) 90 mcg/actuation inhaler Inhale 2 puffs every 6 (six) hours as needed for wheezing or shortness of breath. 18 g 1   • cholecalciferol, vitamin D3, (VITAMIN D3) 100 mcg (4,000 unit) capsule Take 4,000 Units by mouth daily.     • cyanocobalamin (VITAMIN B12) 500 mcg tablet      • fluticasone propionate (FLONASE) 50 mcg/actuation nasal spray Administer 1 spray into each nostril daily.     • hydrochlorothiazide (HYDRODIURIL) 25 mg tablet Take 1  "tablet (25 mg total) by mouth daily. 90 tablet 3   • L.acid/B.bifidum/B.animal/FOS (PROBIOTIC COMPLEX ORAL) Take by mouth.     • olmesartan (BENICAR) 40 mg tablet Take 1 tablet (40 mg total) by mouth daily. 90 tablet 3   • omeprazole (PriLOSEC) 20 mg capsule Take 1 capsule (20 mg total) by mouth daily before breakfast. 90 capsule 1   • rosuvastatin (CRESTOR) 10 mg tablet Take 1 tablet (10 mg total) by mouth once daily. 90 tablet 3   • zinc 50 mg tablet        No current facility-administered medications for this visit.     Social History     Tobacco Use   • Smoking status: Never   • Smokeless tobacco: Never   Vaping Use   • Vaping status: Never Used   Substance Use Topics   • Alcohol use: Yes     Comment: Occasionally   • Drug use: Not Currently     Family History   Problem Relation Age of Onset   • Lung cancer Biological Mother    • Hyperlipidemia Biological Father    • Hypertension Biological Father    • Heart disease Biological Father    • Asthma Biological Sister    • Heart disease Maternal Grandfather    • No Known Problems Biological Sister    • No Known Problems Biological Sister        Objective   Vitals  Vitals:    04/13/23 0954   BP: 128/76   BP Location: Left upper arm   Patient Position: Sitting   Pulse: 73   Resp: 16   Temp: 36.3 °C (97.3 °F)   TempSrc: Temporal   SpO2: 99%   Weight: 66.7 kg (147 lb)   Height: 1.6 m (5' 3\")     Body mass index is 26.04 kg/m².    Advanced Care Plan  Does patient have advance directive?: Yes       Patient has Advance Directive: Advance Directive is NOT in chart, requested to bring in   Does patient have current OOH DNR form?: No           Does patient have current POLST?: Yes       Patient has current POLST: POLST is NOT in chart, requested to bring in     PHQ  Will the patient answer the depression questions?: Yes   Little interest or pleasure in doing things: Not at all   Feeling down, depressed, or hopeless: Not at all   Depression Risk: 0                             "                   Mini Cog  Completed: Yes  Score: 5  Result: Negative    Get Up and Go  Result: Pass    STEADI Falls Risk  One or more falls in the last year: Yes   How many times: 1   Was the patient injured in any fall: No   Has trouble stepping up onto a curb: No   Advised to use a cane or walker to get around safely: No   Often has to rush to the toilet: No   Feels unsteady when walking: No   Has lost some feeling in feet: No   Often feels sad or depressed: No   Steadies self on furniture while walking at home: No   Takes medication that makes him/her feel lightheaded or more tired than usual: No   Worried about falling: No   Takes medicine to sleep or improve mood: No   Needs to push with hands when rising from a chair: No   Falls screen completed: Yes     Hearing and Vision Screening  No results found.  See HRA for relevant hearing screening response.    Assessment/Plan   Diagnoses and all orders for this visit:    Encounter for Medicare annual wellness exam (Primary)    Moderate persistent asthma without complication    Primary hypertension  -     CBC and differential; Future  -     Comprehensive metabolic panel; Future  -     Lipid panel; Future    Gastroesophageal reflux disease without esophagitis    Thyroid goiter  -     TSH; Future  -     T4, free; Future    Osteopenia of multiple sites    Vitamin D deficiency  -     Vitamin D 25 hydroxy; Future    B12 deficiency  -     Vitamin B12; Future    Colon cancer screening  -     FIT Medicare; Future    Uspstf guidelines discussed with patient.  Due for FIT , kit provided. Will check labs. Doing well on current regime. All questions and concerns addressed.    MED Arredondo      See Patient Instructions (the written plan) which was given to the patient for PPPS and health risk factors with interventions.

## 2023-04-17 ENCOUNTER — APPOINTMENT (OUTPATIENT)
Dept: LAB | Age: 68
End: 2023-04-17
Attending: NURSE PRACTITIONER
Payer: MEDICARE

## 2023-04-17 DIAGNOSIS — E53.8 B12 DEFICIENCY: ICD-10-CM

## 2023-04-17 DIAGNOSIS — E55.9 VITAMIN D DEFICIENCY: ICD-10-CM

## 2023-04-17 DIAGNOSIS — E04.9 THYROID GOITER: ICD-10-CM

## 2023-04-17 DIAGNOSIS — I10 PRIMARY HYPERTENSION: ICD-10-CM

## 2023-04-17 DIAGNOSIS — Z12.11 COLON CANCER SCREENING: ICD-10-CM

## 2023-04-17 LAB
25(OH)D3 SERPL-MCNC: 46 NG/ML (ref 30–100)
ALBUMIN SERPL-MCNC: 3.8 G/DL (ref 3.4–5)
ALP SERPL-CCNC: 78 IU/L (ref 35–126)
ALT SERPL-CCNC: 27 IU/L (ref 11–54)
ANION GAP SERPL CALC-SCNC: 8 MEQ/L (ref 3–15)
AST SERPL-CCNC: 23 IU/L (ref 15–41)
BASOPHILS # BLD: 0.07 K/UL (ref 0.01–0.1)
BASOPHILS NFR BLD: 1.1 %
BILIRUB SERPL-MCNC: 0.4 MG/DL (ref 0.3–1.2)
BUN SERPL-MCNC: 9 MG/DL (ref 8–20)
CALCIUM SERPL-MCNC: 10.6 MG/DL (ref 8.9–10.3)
CHLORIDE SERPL-SCNC: 103 MEQ/L (ref 98–109)
CHOLEST SERPL-MCNC: 152 MG/DL
CO2 SERPL-SCNC: 30 MEQ/L (ref 22–32)
CREAT SERPL-MCNC: 0.7 MG/DL (ref 0.6–1.1)
DIFFERENTIAL METHOD BLD: ABNORMAL
EOSINOPHIL # BLD: 0.33 K/UL (ref 0.04–0.36)
EOSINOPHIL NFR BLD: 5.3 %
ERYTHROCYTE [DISTWIDTH] IN BLOOD BY AUTOMATED COUNT: 13.2 % (ref 11.7–14.4)
GFR SERPL CREATININE-BSD FRML MDRD: >60 ML/MIN/1.73M*2
GLUCOSE SERPL-MCNC: 103 MG/DL (ref 70–99)
HCT VFR BLDCO AUTO: 43.2 % (ref 35–45)
HDLC SERPL-MCNC: 57 MG/DL
HDLC SERPL: 2.7 {RATIO}
HGB BLD-MCNC: 13.8 G/DL (ref 11.8–15.7)
IMM GRANULOCYTES # BLD AUTO: 0.02 K/UL (ref 0–0.08)
IMM GRANULOCYTES NFR BLD AUTO: 0.3 %
LDLC SERPL CALC-MCNC: 68 MG/DL
LYMPHOCYTES # BLD: 2.21 K/UL (ref 1.2–3.5)
LYMPHOCYTES NFR BLD: 35.6 %
MCH RBC QN AUTO: 28.6 PG (ref 28–33.2)
MCHC RBC AUTO-ENTMCNC: 31.9 G/DL (ref 32.2–35.5)
MCV RBC AUTO: 89.6 FL (ref 83–98)
MONOCYTES # BLD: 0.41 K/UL (ref 0.28–0.8)
MONOCYTES NFR BLD: 6.6 %
NEUTROPHILS # BLD: 3.17 K/UL (ref 1.7–7)
NEUTS SEG NFR BLD: 51.1 %
NONHDLC SERPL-MCNC: 95 MG/DL
NRBC BLD-RTO: 0 %
PDW BLD AUTO: 11.6 FL (ref 9.4–12.3)
PLATELET # BLD AUTO: 256 K/UL (ref 150–369)
POTASSIUM SERPL-SCNC: 4 MEQ/L (ref 3.6–5.1)
PROT SERPL-MCNC: 5.8 G/DL (ref 6–8.2)
RBC # BLD AUTO: 4.82 M/UL (ref 3.93–5.22)
SODIUM SERPL-SCNC: 141 MEQ/L (ref 136–144)
T4 FREE SERPL-MCNC: 0.74 NG/DL (ref 0.58–1.64)
TRIGL SERPL-MCNC: 134 MG/DL (ref 30–149)
TSH SERPL DL<=0.05 MIU/L-ACNC: 2.16 MIU/L (ref 0.34–5.6)
VIT B12 SERPL-MCNC: 520 PG/ML (ref 180–914)
WBC # BLD AUTO: 6.21 K/UL (ref 3.8–10.5)

## 2023-04-17 PROCEDURE — 36415 COLL VENOUS BLD VENIPUNCTURE: CPT

## 2023-04-17 PROCEDURE — 85025 COMPLETE CBC W/AUTO DIFF WBC: CPT

## 2023-04-17 PROCEDURE — 84439 ASSAY OF FREE THYROXINE: CPT

## 2023-04-17 PROCEDURE — 80053 COMPREHEN METABOLIC PANEL: CPT

## 2023-04-17 PROCEDURE — 82607 VITAMIN B-12: CPT

## 2023-04-17 PROCEDURE — 84443 ASSAY THYROID STIM HORMONE: CPT

## 2023-04-17 PROCEDURE — 82306 VITAMIN D 25 HYDROXY: CPT

## 2023-04-17 PROCEDURE — 80061 LIPID PANEL: CPT

## 2023-04-26 ENCOUNTER — APPOINTMENT (OUTPATIENT)
Dept: LAB | Facility: HOSPITAL | Age: 68
End: 2023-04-26
Attending: NURSE PRACTITIONER
Payer: MEDICARE

## 2023-04-26 PROCEDURE — 82274 ASSAY TEST FOR BLOOD FECAL: CPT

## 2023-04-27 LAB — HEMOCCULT STL QL IA: NEGATIVE

## 2023-05-02 RX ORDER — OMEPRAZOLE 20 MG/1
CAPSULE, DELAYED RELEASE ORAL
Qty: 90 CAPSULE | Refills: 1 | Status: SHIPPED | OUTPATIENT
Start: 2023-05-02 | End: 2023-05-03 | Stop reason: SDUPTHER

## 2023-05-02 NOTE — TELEPHONE ENCOUNTER
Medicine Refill Request    Last Office: 1/12/2023   Last Consult Visit: Visit date not found  Last Telemedicine Visit: Visit date not found    Next Appointment: Visit date not found      Current Outpatient Medications:   •  albuterol HFA (VENTOLIN HFA) 90 mcg/actuation inhaler, Inhale 2 puffs every 6 (six) hours as needed for wheezing or shortness of breath., Disp: 18 g, Rfl: 1  •  cholecalciferol, vitamin D3, (VITAMIN D3) 100 mcg (4,000 unit) capsule, Take 4,000 Units by mouth daily., Disp: , Rfl:   •  cyanocobalamin (VITAMIN B12) 500 mcg tablet, , Disp: , Rfl:   •  fluticasone propionate (FLONASE) 50 mcg/actuation nasal spray, Administer 1 spray into each nostril daily., Disp: , Rfl:   •  hydrochlorothiazide (HYDRODIURIL) 25 mg tablet, Take 1 tablet (25 mg total) by mouth daily., Disp: 90 tablet, Rfl: 3  •  L.acid/B.bifidum/B.animal/FOS (PROBIOTIC COMPLEX ORAL), Take by mouth., Disp: , Rfl:   •  olmesartan (BENICAR) 40 mg tablet, Take 1 tablet (40 mg total) by mouth daily., Disp: 90 tablet, Rfl: 3  •  omeprazole (PriLOSEC) 20 mg capsule, Take 1 capsule (20 mg total) by mouth daily before breakfast., Disp: 90 capsule, Rfl: 1  •  rosuvastatin (CRESTOR) 10 mg tablet, Take 1 tablet (10 mg total) by mouth once daily., Disp: 90 tablet, Rfl: 3  •  zinc 50 mg tablet, , Disp: , Rfl:       BP Readings from Last 3 Encounters:   04/13/23 128/76   01/12/23 140/88   05/25/22 128/82       Recent Lab results:  Lab Results   Component Value Date    CHOL 152 04/17/2023   ,   Lab Results   Component Value Date    HDL 57 04/17/2023   ,   Lab Results   Component Value Date    LDLCALC 68 04/17/2023   ,   Lab Results   Component Value Date    TRIG 134 04/17/2023        Lab Results   Component Value Date    GLUCOSE 103 (H) 04/17/2023   , No results found for: HGBA1C      Lab Results   Component Value Date    CREATININE 0.7 04/17/2023       Lab Results   Component Value Date    TSH 2.16 04/17/2023

## 2023-05-08 RX ORDER — OMEPRAZOLE 20 MG/1
20 CAPSULE, DELAYED RELEASE ORAL
Qty: 90 CAPSULE | Refills: 1 | Status: SHIPPED | OUTPATIENT
Start: 2023-05-08 | End: 2025-01-02 | Stop reason: SDUPTHER

## 2023-05-25 RX ORDER — OLMESARTAN MEDOXOMIL AND HYDROCHLOROTHIAZIDE 40/25 40; 25 MG/1; MG/1
1 TABLET ORAL DAILY
COMMUNITY
Start: 2019-11-11

## 2023-05-25 RX ORDER — LORATADINE 10 MG/1
10 TABLET ORAL
COMMUNITY

## 2023-05-25 RX ORDER — SUMATRIPTAN 100 MG/1
TABLET, FILM COATED ORAL
COMMUNITY
Start: 2017-11-26

## 2023-05-25 RX ORDER — ALBUTEROL SULFATE 90 UG/1
AEROSOL, METERED RESPIRATORY (INHALATION)
COMMUNITY
Start: 2018-02-14

## 2023-05-25 RX ORDER — FLUTICASONE PROPIONATE 220 UG/1
2 AEROSOL, METERED RESPIRATORY (INHALATION) 2 TIMES DAILY
COMMUNITY
Start: 2019-07-31

## 2023-05-25 RX ORDER — MELOXICAM 7.5 MG/1
TABLET ORAL PRN
COMMUNITY

## 2023-05-25 RX ORDER — FLUTICASONE PROPIONATE 50 MCG
2 SPRAY, SUSPENSION (ML) NASAL DAILY
COMMUNITY

## 2023-06-09 ENCOUNTER — HOSPITAL ENCOUNTER (OUTPATIENT)
Dept: RADIOLOGY | Age: 68
Discharge: HOME | End: 2023-06-09
Attending: FAMILY MEDICINE
Payer: MEDICARE

## 2023-06-09 DIAGNOSIS — Z12.31 ENCOUNTER FOR SCREENING MAMMOGRAM FOR MALIGNANT NEOPLASM OF BREAST: ICD-10-CM

## 2023-06-09 PROCEDURE — 77063 BREAST TOMOSYNTHESIS BI: CPT

## 2023-06-28 RX ORDER — OLMESARTAN MEDOXOMIL 40 MG/1
40 TABLET ORAL DAILY
Qty: 90 TABLET | Refills: 3 | Status: SHIPPED | OUTPATIENT
Start: 2023-06-28 | End: 2024-06-20

## 2023-06-28 NOTE — TELEPHONE ENCOUNTER
Medicine Refill Request    Last Office: 4/13/2023   Last Consult Visit: Visit date not found  Last Telemedicine Visit: Visit date not found    Next Appointment: Visit date not found

## 2023-07-24 ENCOUNTER — OFFICE VISIT (OUTPATIENT)
Dept: PRIMARY CARE | Facility: CLINIC | Age: 68
End: 2023-07-24
Payer: MEDICARE

## 2023-07-24 VITALS
RESPIRATION RATE: 16 BRPM | HEART RATE: 72 BPM | BODY MASS INDEX: 25.26 KG/M2 | TEMPERATURE: 97.6 F | WEIGHT: 142.6 LBS | OXYGEN SATURATION: 96 % | DIASTOLIC BLOOD PRESSURE: 70 MMHG | SYSTOLIC BLOOD PRESSURE: 110 MMHG

## 2023-07-24 DIAGNOSIS — J06.9 UPPER RESPIRATORY TRACT INFECTION, UNSPECIFIED TYPE: Primary | ICD-10-CM

## 2023-07-24 PROCEDURE — G8754 DIAS BP LESS 90: HCPCS | Performed by: PHYSICIAN ASSISTANT

## 2023-07-24 PROCEDURE — G8752 SYS BP LESS 140: HCPCS | Performed by: PHYSICIAN ASSISTANT

## 2023-07-24 PROCEDURE — 99214 OFFICE O/P EST MOD 30 MIN: CPT | Performed by: PHYSICIAN ASSISTANT

## 2023-07-24 RX ORDER — AZITHROMYCIN 250 MG/1
TABLET, FILM COATED ORAL
Qty: 6 TABLET | Refills: 0 | Status: SHIPPED | OUTPATIENT
Start: 2023-07-24 | End: 2023-07-29

## 2023-07-24 ASSESSMENT — ENCOUNTER SYMPTOMS
WHEEZING: 0
MYALGIAS: 1
CHILLS: 1
SORE THROAT: 0
DIAPHORESIS: 0
SINUS PRESSURE: 0
DIZZINESS: 1
COUGH: 1
SHORTNESS OF BREATH: 0
FATIGUE: 1
RHINORRHEA: 0
HEADACHES: 0
FEVER: 0

## 2023-07-24 ASSESSMENT — PAIN SCALES - GENERAL: PAINLEVEL: 0-NO PAIN

## 2023-07-24 NOTE — PROGRESS NOTES
Janette Jordan PA-C  Family Medicine in [address]  [place] [address]  Phone: [phone]       Cristina So is a 68 y.o. female who presents today for   Chief Complaint   Patient presents with   • Swollen glands, cough, achy, fatigue, chills,  suspects ear       Presents today for acute visit   She complains of cough, body aches, fatigue, congestion, and swollen glands for the past 2-3 weeks  Also noting some occasional dizziness with symptoms   No fever   Was taking dayquil for her symptoms       Past Medical History:   Diagnosis Date   • Allergies    • Arthritis    • Asthma    • Basal cell carcinoma    • Chronic maxillary sinusitis 2020   • Herpes genitalia    • Hypertension    • Lipid disorder        Past Surgical History:   Procedure Laterality Date   •  SECTION     • CHOLECYSTECTOMY     • EYE SURGERY     • HYSTERECTOMY     • SINUS SURGERY     • TONSILLECTOMY     • UTERINE FIBROID EMBOLIZATION         Social History     Tobacco Use   • Smoking status: Never   • Smokeless tobacco: Never   Vaping Use   • Vaping Use: Never used   Substance Use Topics   • Alcohol use: Yes     Comment: Occasionally   • Drug use: Not Currently       Family History   Problem Relation Age of Onset   • Lung cancer Biological Mother    • Hyperlipidemia Biological Father    • Hypertension Biological Father    • Heart disease Biological Father    • Asthma Biological Sister    • Heart disease Maternal Grandfather    • No Known Problems Biological Sister    • No Known Problems Biological Sister        Ampicillin, Atenolol, Cefdinir, and Lisinopril      Current Outpatient Medications:   •  albuterol HFA (VENTOLIN HFA) 90 mcg/actuation inhaler, Inhale 2 puffs every 6 (six) hours as needed for wheezing or shortness of breath., Disp: 18 g, Rfl: 1  •  cholecalciferol, vitamin D3, (VITAMIN D3) 100 mcg (4,000 unit) capsule, Take 4,000 Units by mouth daily., Disp: , Rfl:   •  cyanocobalamin  (VITAMIN B12) 500 mcg tablet, , Disp: , Rfl:   •  fluticasone propionate (FLONASE) 50 mcg/actuation nasal spray, Administer 1 spray into each nostril daily., Disp: , Rfl:   •  hydrochlorothiazide (HYDRODIURIL) 25 mg tablet, Take 1 tablet (25 mg total) by mouth daily., Disp: 90 tablet, Rfl: 3  •  L.acid/B.bifidum/B.animal/FOS (PROBIOTIC COMPLEX ORAL), Take by mouth., Disp: , Rfl:   •  olmesartan (BENICAR) 40 mg tablet, Take 1 tablet (40 mg total) by mouth daily., Disp: 90 tablet, Rfl: 3  •  omeprazole (PriLOSEC) 20 mg capsule, Take 1 capsule (20 mg total) by mouth daily before breakfast., Disp: 90 capsule, Rfl: 1  •  rosuvastatin (CRESTOR) 10 mg tablet, Take 1 tablet (10 mg total) by mouth once daily., Disp: 90 tablet, Rfl: 3  •  zinc 50 mg tablet, , Disp: , Rfl:     Review of Systems   Constitutional: Positive for chills and fatigue. Negative for diaphoresis and fever.   HENT: Positive for congestion. Negative for ear pain, postnasal drip, rhinorrhea, sinus pressure and sore throat.    Respiratory: Positive for cough. Negative for shortness of breath and wheezing.    Cardiovascular: Negative for chest pain.   Musculoskeletal: Positive for myalgias.   Neurological: Positive for dizziness. Negative for headaches.   All other systems reviewed and are negative.      Objective   Vitals:    07/24/23 1327   BP: 110/70   BP Location: Left upper arm   Patient Position: Sitting   Pulse: 72   Resp: 16   Temp: 36.4 °C (97.6 °F)   TempSrc: Temporal   SpO2: 96%   Weight: 64.7 kg (142 lb 9.6 oz)     Body mass index is 25.26 kg/m².    Physical Exam  Vitals reviewed.   Constitutional:       Appearance: Normal appearance.   HENT:      Head: Normocephalic and atraumatic.      Right Ear: Ear canal and external ear normal. A middle ear effusion is present. Tympanic membrane is bulging.      Left Ear: Tympanic membrane, ear canal and external ear normal.      Ears:      Comments: Right TM dull        Nose: Nose normal. No congestion or  rhinorrhea.      Mouth/Throat:      Pharynx: Oropharynx is clear. No oropharyngeal exudate or posterior oropharyngeal erythema.   Eyes:      Conjunctiva/sclera: Conjunctivae normal.   Cardiovascular:      Rate and Rhythm: Normal rate and regular rhythm.      Heart sounds: Normal heart sounds. No murmur heard.  Pulmonary:      Effort: Pulmonary effort is normal. No respiratory distress.      Breath sounds: Normal breath sounds.   Lymphadenopathy:      Cervical: No cervical adenopathy.   Neurological:      General: No focal deficit present.      Mental Status: She is alert and oriented to person, place, and time.   Psychiatric:         Mood and Affect: Mood normal.         Behavior: Behavior normal.       ASSESSMENT/PLAN:  Diagnoses and all orders for this visit:    Upper respiratory tract infection, unspecified type  -     azithromycin (ZITHROMAX) 250 mg tablet; Take 2 tablets the first day, then 1 tablet daily for 4 days.  - Discussed use of OTC medications for symptomatic relief       The risks and benefits of taking the above medication(s) were reviewed with the patient.  Dosing options and how to take the medication(s) were discussed.  Possible common side effects were reviewed as well.    The pt voiced understanding to the plan as outlined above.      KELLIE Ruiz  7/24/2023

## 2023-08-21 ENCOUNTER — OFFICE VISIT (OUTPATIENT)
Dept: PRIMARY CARE | Facility: CLINIC | Age: 68
End: 2023-08-21
Payer: MEDICARE

## 2023-08-21 VITALS
TEMPERATURE: 97.9 F | WEIGHT: 143 LBS | SYSTOLIC BLOOD PRESSURE: 118 MMHG | BODY MASS INDEX: 28.07 KG/M2 | DIASTOLIC BLOOD PRESSURE: 60 MMHG | HEIGHT: 60 IN | OXYGEN SATURATION: 98 % | HEART RATE: 87 BPM

## 2023-08-21 DIAGNOSIS — R09.81 NASAL CONGESTION: Primary | ICD-10-CM

## 2023-08-21 DIAGNOSIS — J30.9 ALLERGIC RHINITIS, UNSPECIFIED SEASONALITY, UNSPECIFIED TRIGGER: ICD-10-CM

## 2023-08-21 PROCEDURE — G8754 DIAS BP LESS 90: HCPCS | Performed by: PHYSICIAN ASSISTANT

## 2023-08-21 PROCEDURE — 99213 OFFICE O/P EST LOW 20 MIN: CPT | Performed by: PHYSICIAN ASSISTANT

## 2023-08-21 PROCEDURE — G8752 SYS BP LESS 140: HCPCS | Performed by: PHYSICIAN ASSISTANT

## 2023-08-21 ASSESSMENT — ENCOUNTER SYMPTOMS
HEADACHES: 0
FATIGUE: 0
SHORTNESS OF BREATH: 0
DIZZINESS: 0
DIAPHORESIS: 0
CHILLS: 0
SINUS PRESSURE: 0
WHEEZING: 0
FEVER: 0
SORE THROAT: 0
COUGH: 0
RHINORRHEA: 0

## 2023-08-21 NOTE — PROGRESS NOTES
Janette Jordan PA-C  Family Medicine in [address]  [place] [address]  Phone: [phone]       Cristina So is a 68 y.o. female who presents today for   Chief Complaint   Patient presents with   • Sinus Problem       Presents today for follow-up visit   Still dealing with mild nasal congestion   Has finished two antibiotics and a steroid over the past 3-4 weeks   Most symptoms resolved  No new symptoms      Past Medical History:   Diagnosis Date   • Allergies    • Arthritis    • Asthma    • Basal cell carcinoma    • Chronic maxillary sinusitis 2020   • Herpes genitalia    • Hypertension    • Lipid disorder        Past Surgical History:   Procedure Laterality Date   •  SECTION     • CHOLECYSTECTOMY     • EYE SURGERY     • HYSTERECTOMY     • SINUS SURGERY     • TONSILLECTOMY     • UTERINE FIBROID EMBOLIZATION         Social History     Tobacco Use   • Smoking status: Never   • Smokeless tobacco: Never   Vaping Use   • Vaping Use: Never used   Substance Use Topics   • Alcohol use: Yes     Comment: Occasionally   • Drug use: Not Currently       Family History   Problem Relation Age of Onset   • Lung cancer Biological Mother    • Hyperlipidemia Biological Father    • Hypertension Biological Father    • Heart disease Biological Father    • Asthma Biological Sister    • Heart disease Maternal Grandfather    • No Known Problems Biological Sister    • No Known Problems Biological Sister        Ampicillin, Atenolol, Cefdinir, and Lisinopril      Current Outpatient Medications:   •  albuterol HFA (VENTOLIN HFA) 90 mcg/actuation inhaler, Inhale 2 puffs every 6 (six) hours as needed for wheezing or shortness of breath., Disp: 18 g, Rfl: 1  •  cholecalciferol, vitamin D3, (VITAMIN D3) 100 mcg (4,000 unit) capsule, Take 4,000 Units by mouth daily., Disp: , Rfl:   •  cyanocobalamin (VITAMIN B12) 500 mcg tablet, , Disp: , Rfl:   •  fluticasone propionate (FLONASE) 50  mcg/actuation nasal spray, Administer 1 spray into each nostril daily., Disp: , Rfl:   •  hydrochlorothiazide (HYDRODIURIL) 25 mg tablet, Take 1 tablet (25 mg total) by mouth daily., Disp: 90 tablet, Rfl: 3  •  L.acid/B.bifidum/B.animal/FOS (PROBIOTIC COMPLEX ORAL), Take by mouth., Disp: , Rfl:   •  olmesartan (BENICAR) 40 mg tablet, Take 1 tablet (40 mg total) by mouth daily., Disp: 90 tablet, Rfl: 3  •  omeprazole (PriLOSEC) 20 mg capsule, Take 1 capsule (20 mg total) by mouth daily before breakfast., Disp: 90 capsule, Rfl: 1  •  rosuvastatin (CRESTOR) 10 mg tablet, Take 1 tablet (10 mg total) by mouth once daily., Disp: 90 tablet, Rfl: 3  •  zinc 50 mg tablet, , Disp: , Rfl:     Review of Systems   Constitutional: Negative for chills, diaphoresis, fatigue and fever.   HENT: Positive for congestion. Negative for ear pain, postnasal drip, rhinorrhea, sinus pressure and sore throat.    Respiratory: Negative for cough, shortness of breath and wheezing.    Cardiovascular: Negative for chest pain.   Neurological: Negative for dizziness and headaches.   All other systems reviewed and are negative.      Objective   Vitals:    08/21/23 1421   BP: 118/60   Pulse: 87   Temp: 36.6 °C (97.9 °F)   SpO2: 98%   Weight: 64.9 kg (143 lb)   Height: 1.524 m (5')     Body mass index is 27.93 kg/m².    Physical Exam  Vitals reviewed.   Constitutional:       Appearance: Normal appearance.   HENT:      Head: Normocephalic and atraumatic.      Right Ear: Tympanic membrane, ear canal and external ear normal.      Left Ear: Tympanic membrane, ear canal and external ear normal.      Nose: Nose normal. No congestion or rhinorrhea.      Comments: + pale/bluish nasal mucosa     Mouth/Throat:      Pharynx: Oropharynx is clear. No oropharyngeal exudate or posterior oropharyngeal erythema.   Eyes:      Conjunctiva/sclera: Conjunctivae normal.   Cardiovascular:      Rate and Rhythm: Normal rate and regular rhythm.      Heart sounds: Normal heart  sounds. No murmur heard.  Pulmonary:      Effort: Pulmonary effort is normal. No respiratory distress.      Breath sounds: Normal breath sounds.   Lymphadenopathy:      Cervical: No cervical adenopathy.   Neurological:      General: No focal deficit present.      Mental Status: She is alert and oriented to person, place, and time.   Psychiatric:         Mood and Affect: Mood normal.         Behavior: Behavior normal.         ASSESSMENT/PLAN:  Diagnoses and all orders for this visit:    Nasal congestion    Allergic rhinitis, unspecified seasonality, unspecified trigger    Advised to take daily zyrtec and use flonase nasal spray       The risks and benefits of taking the above medication(s) were reviewed with the patient.  Dosing options and how to take the medication(s) were discussed.  Possible common side effects were reviewed as well.    The pt voiced understanding to the plan as outlined above.      KELLIE Ruiz  8/21/2023

## 2023-09-19 RX ORDER — HYDROCHLOROTHIAZIDE 25 MG/1
25 TABLET ORAL DAILY
Qty: 90 TABLET | Refills: 3 | Status: SHIPPED | OUTPATIENT
Start: 2023-09-19 | End: 2024-09-08

## 2023-09-19 NOTE — TELEPHONE ENCOUNTER
"Medicine Refill Request    Last Office Visit: 8/21/2023   Last Consult Visit: Visit date not found  Last Telemedicine Visit: Visit date not found    Next Appointment: Visit date not found      Current Outpatient Medications:     albuterol HFA (VENTOLIN HFA) 90 mcg/actuation inhaler, Inhale 2 puffs every 6 (six) hours as needed for wheezing or shortness of breath., Disp: 18 g, Rfl: 1    cholecalciferol, vitamin D3, (VITAMIN D3) 100 mcg (4,000 unit) capsule, Take 4,000 Units by mouth daily., Disp: , Rfl:     cyanocobalamin (VITAMIN B12) 500 mcg tablet, , Disp: , Rfl:     fluticasone propionate (FLONASE) 50 mcg/actuation nasal spray, Administer 1 spray into each nostril daily., Disp: , Rfl:     hydrochlorothiazide (HYDRODIURIL) 25 mg tablet, Take 1 tablet (25 mg total) by mouth daily., Disp: 90 tablet, Rfl: 3    L.acid/B.bifidum/B.animal/FOS (PROBIOTIC COMPLEX ORAL), Take by mouth., Disp: , Rfl:     olmesartan (BENICAR) 40 mg tablet, Take 1 tablet (40 mg total) by mouth daily., Disp: 90 tablet, Rfl: 3    omeprazole (PriLOSEC) 20 mg capsule, Take 1 capsule (20 mg total) by mouth daily before breakfast., Disp: 90 capsule, Rfl: 1    rosuvastatin (CRESTOR) 10 mg tablet, Take 1 tablet (10 mg total) by mouth once daily., Disp: 90 tablet, Rfl: 3    zinc 50 mg tablet, , Disp: , Rfl:       BP Readings from Last 3 Encounters:   08/21/23 118/60   07/24/23 110/70   04/13/23 128/76       Recent Lab results:  Lab Results   Component Value Date    CHOL 152 04/17/2023   ,   Lab Results   Component Value Date    HDL 57 04/17/2023   ,   Lab Results   Component Value Date    LDLCALC 68 04/17/2023   ,   Lab Results   Component Value Date    TRIG 134 04/17/2023        Lab Results   Component Value Date    GLUCOSE 103 (H) 04/17/2023   , No results found for: \"HGBA1C\"      Lab Results   Component Value Date    CREATININE 0.7 04/17/2023       Lab Results   Component Value Date    TSH 2.16 04/17/2023         No results found for: " "\"HGBA1C\"    "

## 2023-10-18 RX ORDER — ROSUVASTATIN CALCIUM 10 MG/1
10 TABLET, COATED ORAL DAILY
Qty: 90 TABLET | Refills: 3 | Status: SHIPPED | OUTPATIENT
Start: 2023-10-18 | End: 2024-09-30

## 2023-11-29 ENCOUNTER — APPOINTMENT (OUTPATIENT)
Dept: RADIOLOGY | Age: 68
End: 2023-11-29
Attending: NURSE PRACTITIONER
Payer: MEDICARE

## 2023-11-29 ENCOUNTER — HOSPITAL ENCOUNTER (OUTPATIENT)
Facility: CLINIC | Age: 68
Discharge: HOME | End: 2023-11-29
Attending: FAMILY MEDICINE
Payer: MEDICARE

## 2023-11-29 VITALS
TEMPERATURE: 98 F | SYSTOLIC BLOOD PRESSURE: 108 MMHG | DIASTOLIC BLOOD PRESSURE: 73 MMHG | OXYGEN SATURATION: 100 % | RESPIRATION RATE: 16 BRPM | HEART RATE: 82 BPM

## 2023-11-29 DIAGNOSIS — S16.1XXA CERVICAL STRAIN, ACUTE, INITIAL ENCOUNTER: Primary | ICD-10-CM

## 2023-11-29 PROCEDURE — 99213 OFFICE O/P EST LOW 20 MIN: CPT | Performed by: NURSE PRACTITIONER

## 2023-11-29 PROCEDURE — 73030 X-RAY EXAM OF SHOULDER: CPT | Mod: RT | Performed by: NURSE PRACTITIONER

## 2023-11-29 RX ORDER — CYCLOBENZAPRINE HCL 10 MG
10 TABLET ORAL NIGHTLY PRN
Qty: 14 TABLET | Refills: 0 | Status: SHIPPED | OUTPATIENT
Start: 2023-11-29 | End: 2024-01-26 | Stop reason: ALTCHOICE

## 2024-01-26 ENCOUNTER — OFFICE VISIT (OUTPATIENT)
Dept: PRIMARY CARE | Facility: CLINIC | Age: 69
End: 2024-01-26
Payer: MEDICARE

## 2024-01-26 VITALS
HEIGHT: 60 IN | TEMPERATURE: 97.4 F | WEIGHT: 142 LBS | OXYGEN SATURATION: 98 % | SYSTOLIC BLOOD PRESSURE: 114 MMHG | BODY MASS INDEX: 27.88 KG/M2 | HEART RATE: 71 BPM | DIASTOLIC BLOOD PRESSURE: 68 MMHG

## 2024-01-26 DIAGNOSIS — J01.90 ACUTE NON-RECURRENT SINUSITIS, UNSPECIFIED LOCATION: Primary | ICD-10-CM

## 2024-01-26 PROCEDURE — G8754 DIAS BP LESS 90: HCPCS | Performed by: NURSE PRACTITIONER

## 2024-01-26 PROCEDURE — 99213 OFFICE O/P EST LOW 20 MIN: CPT | Performed by: NURSE PRACTITIONER

## 2024-01-26 PROCEDURE — G8752 SYS BP LESS 140: HCPCS | Performed by: NURSE PRACTITIONER

## 2024-01-26 RX ORDER — AZITHROMYCIN 250 MG/1
TABLET, FILM COATED ORAL
Qty: 6 TABLET | Refills: 0 | Status: SHIPPED | OUTPATIENT
Start: 2024-01-26 | End: 2024-01-31

## 2024-01-26 ASSESSMENT — ENCOUNTER SYMPTOMS
NAUSEA: 1
RHINORRHEA: 1
COUGH: 1
SINUS PRESSURE: 1
SORE THROAT: 1

## 2024-01-26 NOTE — PROGRESS NOTES
Subjective     Patient ID: Cristina So is a 68 y.o. female.    HPI  Cold in Decemeber, then with lingering cough and sore throat. Past couple days with dizziness, sinus pressure, drainage, some nausea. Some by aches  Denies fever    Review of Systems   HENT: Positive for congestion, postnasal drip, rhinorrhea, sinus pressure and sore throat.    Respiratory: Positive for cough.    Gastrointestinal: Positive for nausea.       Objective     Vitals:    01/26/24 1254   BP: 114/68   Pulse: 71   Temp: 36.3 °C (97.4 °F)   SpO2: 98%   Weight: 64.4 kg (142 lb)   Height: 1.524 m (5')     Body mass index is 27.73 kg/m².    Physical Exam  Vitals reviewed.   Constitutional:       Appearance: Normal appearance.   HENT:      Right Ear: Tympanic membrane, ear canal and external ear normal.      Left Ear: Tympanic membrane, ear canal and external ear normal.      Nose: Congestion and rhinorrhea present.      Mouth/Throat:      Pharynx: Posterior oropharyngeal erythema present.   Cardiovascular:      Rate and Rhythm: Normal rate and regular rhythm.      Heart sounds: Normal heart sounds.   Pulmonary:      Effort: Pulmonary effort is normal.      Breath sounds: Normal breath sounds.   Musculoskeletal:         General: Normal range of motion.   Skin:     General: Skin is warm and dry.   Neurological:      Mental Status: She is alert and oriented to person, place, and time.         Assessment/Plan   Diagnoses and all orders for this visit:    Acute non-recurrent sinusitis, unspecified location (Primary)    Other orders  -     azithromycin (ZITHROMAX) 250 mg tablet; Take 2 tablets the first day, then 1 tablet daily for 4 days.        Discussed and reviewed plan with patient will do Z-pack. All questions and concerns have been addressed. Patient will contact the office if symptoms fail to improve or worsen.       MED Arredondo

## 2024-05-09 DIAGNOSIS — Z12.31 BREAST CANCER SCREENING BY MAMMOGRAM: Primary | ICD-10-CM

## 2024-06-10 ENCOUNTER — HOSPITAL ENCOUNTER (OUTPATIENT)
Dept: RADIOLOGY | Age: 69
Discharge: HOME | End: 2024-06-10
Attending: FAMILY MEDICINE
Payer: MEDICARE

## 2024-06-10 DIAGNOSIS — Z12.31 BREAST CANCER SCREENING BY MAMMOGRAM: ICD-10-CM

## 2024-06-10 PROCEDURE — 77063 BREAST TOMOSYNTHESIS BI: CPT

## 2024-06-20 RX ORDER — OLMESARTAN MEDOXOMIL 40 MG/1
40 TABLET ORAL DAILY
Qty: 90 TABLET | Refills: 3 | Status: SHIPPED | OUTPATIENT
Start: 2024-06-20

## 2024-07-02 LAB — COLOGUARD: NEGATIVE

## 2024-07-08 RX ORDER — ALBUTEROL SULFATE 90 UG/1
2 INHALANT RESPIRATORY (INHALATION) EVERY 6 HOURS PRN
Qty: 18 G | Refills: 1 | Status: SHIPPED | OUTPATIENT
Start: 2024-07-08

## 2024-07-23 ENCOUNTER — HOSPITAL ENCOUNTER (OUTPATIENT)
Facility: CLINIC | Age: 69
Discharge: LEFT BEFORE DISCHARGE | End: 2024-07-23
Payer: MEDICARE

## 2024-07-23 ENCOUNTER — OFFICE VISIT (OUTPATIENT)
Dept: PRIMARY CARE | Facility: CLINIC | Age: 69
End: 2024-07-23
Payer: MEDICARE

## 2024-07-23 VITALS
HEART RATE: 76 BPM | BODY MASS INDEX: 28.66 KG/M2 | DIASTOLIC BLOOD PRESSURE: 70 MMHG | SYSTOLIC BLOOD PRESSURE: 130 MMHG | WEIGHT: 146 LBS | HEIGHT: 60 IN | RESPIRATION RATE: 16 BRPM | OXYGEN SATURATION: 98 % | TEMPERATURE: 97.4 F

## 2024-07-23 DIAGNOSIS — B34.9 VIRAL SYNDROME: Primary | ICD-10-CM

## 2024-07-23 DIAGNOSIS — R42 VERTIGO: ICD-10-CM

## 2024-07-23 PROCEDURE — G8752 SYS BP LESS 140: HCPCS | Performed by: FAMILY MEDICINE

## 2024-07-23 PROCEDURE — G8754 DIAS BP LESS 90: HCPCS | Performed by: FAMILY MEDICINE

## 2024-07-23 PROCEDURE — 99213 OFFICE O/P EST LOW 20 MIN: CPT | Performed by: FAMILY MEDICINE

## 2024-07-23 RX ORDER — AZITHROMYCIN 250 MG/1
TABLET, FILM COATED ORAL
Qty: 6 TABLET | Refills: 0 | Status: SHIPPED | OUTPATIENT
Start: 2024-07-23 | End: 2024-07-28

## 2024-07-23 ASSESSMENT — ENCOUNTER SYMPTOMS
MYALGIAS: 1
RHINORRHEA: 1
COUGH: 1
SINUS PAIN: 1
SINUS PRESSURE: 1
ARTHRALGIAS: 1

## 2024-07-23 ASSESSMENT — PATIENT HEALTH QUESTIONNAIRE - PHQ9: SUM OF ALL RESPONSES TO PHQ9 QUESTIONS 1 & 2: 0

## 2024-07-23 NOTE — PROGRESS NOTES
Daily Progress Note      Subjective      Patient ID: Cristina So is a 69 y.o. female.    HPI  Worsening uri, now vertigo  Sudden onset, gardual, sudden change  No others ill, f,c,v,n,d    The following have been reviewed and updated as appropriate in this visit:   Problems       Review of Systems   HENT:  Positive for congestion, ear pain, postnasal drip, rhinorrhea, sinus pressure and sinus pain.    Respiratory:  Positive for cough.    Musculoskeletal:  Positive for arthralgias and myalgias.       Current Outpatient Medications   Medication Sig Dispense Refill    albuterol HFA 90 mcg/actuation inhaler Inhale 2 puffs every 6 (six) hours as needed for wheezing or shortness of breath. 18 g 1    azithromycin (ZITHROMAX) 250 mg tablet Take 2 tablets the first day, then 1 tablet daily for 4 days. 6 tablet 0    cholecalciferol, vitamin D3, (VITAMIN D3) 100 mcg (4,000 unit) capsule Take 4,000 Units by mouth daily.      cyanocobalamin (VITAMIN B12) 500 mcg tablet       fluticasone propionate (FLONASE) 50 mcg/actuation nasal spray Administer 1 spray into each nostril daily.      hydrochlorothiazide (HYDRODIURIL) 25 mg tablet TAKE 1 TABLET BY MOUTH EVERY DAY 90 tablet 3    olmesartan (BENICAR) 40 mg tablet TAKE 1 TABLET BY MOUTH EVERY DAY 90 tablet 3    omeprazole (PriLOSEC) 20 mg capsule Take 1 capsule (20 mg total) by mouth daily before breakfast. 90 capsule 1    rosuvastatin (CRESTOR) 10 mg tablet TAKE 1 TABLET BY MOUTH EVERY DAY 90 tablet 3    zinc 50 mg tablet        No current facility-administered medications for this visit.     Past Medical History:   Diagnosis Date    Allergies     Arthritis     Asthma     Basal cell carcinoma     Chronic maxillary sinusitis 11/13/2020    Herpes genitalia     Hypertension     Lipid disorder      Family History   Problem Relation Age of Onset    Lung cancer Biological Mother     Hyperlipidemia Biological Father     Hypertension Biological Father     Heart disease Biological  Father     Asthma Biological Sister     Heart disease Maternal Grandfather     No Known Problems Biological Sister     No Known Problems Biological Sister      Past Surgical History:   Procedure Laterality Date     SECTION      CHOLECYSTECTOMY      EYE SURGERY      HYSTERECTOMY      SINUS SURGERY      TONSILLECTOMY      UTERINE FIBROID EMBOLIZATION       Social History     Socioeconomic History    Marital status:      Spouse name: Not on file    Number of children: Not on file    Years of education: Not on file    Highest education level: Not on file   Occupational History    Occupation: Retired   Tobacco Use    Smoking status: Never     Passive exposure: Never    Smokeless tobacco: Never   Vaping Use    Vaping Use: Never used   Substance and Sexual Activity    Alcohol use: Yes     Comment: Occasionally    Drug use: Not Currently    Sexual activity: Yes     Partners: Male     Birth control/protection: Female Sterilization/Tubes Tied   Other Topics Concern    Not on file   Social History Narrative    Not on file     Social Determinants of Health     Financial Resource Strain: Not on file   Food Insecurity: Not on file   Transportation Needs: Not on file   Physical Activity: Not on file   Stress: Not on file   Social Connections: Not on file   Intimate Partner Violence: Not on file   Housing Stability: Not on file     Allergies   Allergen Reactions    Ampicillin Rash    Atenolol      Can't remember reaction but it was not severe.    Cefdinir Rash    Lisinopril      Can't remember reaction but it was not severe.       Objective   No new labs.    Physical Exam  Cardiovascular:      Rate and Rhythm: Normal rate and regular rhythm.      Pulses: Normal pulses.      Heart sounds: Normal heart sounds, S1 normal and S2 normal.   Pulmonary:      Effort: Pulmonary effort is normal. No respiratory distress.      Breath sounds: Normal breath sounds.         Assessment/Plan     Problem List Items Addressed This  Visit    None  Visit Diagnoses       Viral syndrome    -  Primary    Vertigo              No orders of the defined types were placed in this encounter.    Sabino Armas protocol  Allergy protocol  Rec-heck 4 d        Jed Hines DO  7/23/2024

## 2024-07-23 NOTE — LETTER
July 23, 2024     Patient: Cristina So  YOB: 1955  Date of Visit: 7/23/2024        To Whom It May Concern:      It is my medical opinion that Cristina So may return to work on 07- as she recovers from aviral syndrome . Please excuse accordingly.    If you have any questions or concerns, please don't hesitate to call.                 Sincerely,        Jed Hines, DO

## 2024-08-01 ENCOUNTER — OFFICE VISIT (OUTPATIENT)
Dept: PRIMARY CARE | Facility: CLINIC | Age: 69
End: 2024-08-01
Payer: MEDICARE

## 2024-08-01 VITALS
DIASTOLIC BLOOD PRESSURE: 72 MMHG | OXYGEN SATURATION: 98 % | SYSTOLIC BLOOD PRESSURE: 128 MMHG | RESPIRATION RATE: 16 BRPM | TEMPERATURE: 97.3 F | WEIGHT: 145 LBS | HEIGHT: 60 IN | BODY MASS INDEX: 28.47 KG/M2 | HEART RATE: 70 BPM

## 2024-08-01 DIAGNOSIS — H61.23 BILATERAL IMPACTED CERUMEN: Primary | ICD-10-CM

## 2024-08-01 PROCEDURE — 99213 OFFICE O/P EST LOW 20 MIN: CPT | Performed by: PHYSICIAN ASSISTANT

## 2024-08-01 PROCEDURE — G8752 SYS BP LESS 140: HCPCS | Performed by: PHYSICIAN ASSISTANT

## 2024-08-01 PROCEDURE — G8754 DIAS BP LESS 90: HCPCS | Performed by: PHYSICIAN ASSISTANT

## 2024-08-01 ASSESSMENT — ENCOUNTER SYMPTOMS: CONSTITUTIONAL NEGATIVE: 1

## 2024-08-01 NOTE — PROCEDURES
Ear Cerumen Removal    Date/Time: 8/1/2024 11:24 AM    Performed by: Janette Jordan PA C  Authorized by: Janette Jordan PA C    Consent:     Consent obtained:  Verbal    Consent given by:  Patient    Risks discussed:  Pain  Procedure details:     Location:  L ear and R ear    Procedure type: irrigation      Procedure outcomes: cerumen removed    Post-procedure details:     Inspection:  Ear canal clear, no bleeding and TM intact    Hearing quality:  Improved    Procedure completion:  Tolerated well, no immediate complications

## 2024-08-01 NOTE — PROGRESS NOTES
Janette Jordan PA-C  Family Medicine in [address]  [place] [address]  Phone: [phone]       Cristina So is a 69 y.o. female who presents today for   Chief Complaint   Patient presents with    Ears     Pt states ears are clogged.       Presents today for acute visit   Had been using ear wax drops in her ears last week and symptoms in the left ear got worse   The left side feels very clogged and she cannot hear out of it   She does wear hearing aids daily         Past Medical History:   Diagnosis Date    Allergies     Arthritis     Asthma     Basal cell carcinoma     Chronic maxillary sinusitis 2020    Herpes genitalia     Hypertension     Lipid disorder        Past Surgical History:   Procedure Laterality Date     SECTION      CHOLECYSTECTOMY      EYE SURGERY      HYSTERECTOMY      SINUS SURGERY      TONSILLECTOMY      UTERINE FIBROID EMBOLIZATION         Social History     Tobacco Use    Smoking status: Never     Passive exposure: Never    Smokeless tobacco: Never   Vaping Use    Vaping Use: Never used   Substance Use Topics    Alcohol use: Yes     Comment: Occasionally    Drug use: Not Currently       Family History   Problem Relation Age of Onset    Lung cancer Biological Mother     Hyperlipidemia Biological Father     Hypertension Biological Father     Heart disease Biological Father     Asthma Biological Sister     Heart disease Maternal Grandfather     No Known Problems Biological Sister     No Known Problems Biological Sister        Ampicillin, Atenolol, Cefdinir, and Lisinopril      Current Outpatient Medications:     albuterol HFA 90 mcg/actuation inhaler, Inhale 2 puffs every 6 (six) hours as needed for wheezing or shortness of breath., Disp: 18 g, Rfl: 1    cholecalciferol, vitamin D3, (VITAMIN D3) 100 mcg (4,000 unit) capsule, Take 4,000 Units by mouth daily., Disp: , Rfl:     cyanocobalamin (VITAMIN B12) 500 mcg tablet, , Disp: , Rfl:      fluticasone propionate (FLONASE) 50 mcg/actuation nasal spray, Administer 1 spray into each nostril daily., Disp: , Rfl:     hydrochlorothiazide (HYDRODIURIL) 25 mg tablet, TAKE 1 TABLET BY MOUTH EVERY DAY, Disp: 90 tablet, Rfl: 3    methylPREDNISolone (MEDROL DOSEPACK) 4 mg tablet, Follow package directions., Disp: 21 tablet, Rfl: 0    olmesartan (BENICAR) 40 mg tablet, TAKE 1 TABLET BY MOUTH EVERY DAY, Disp: 90 tablet, Rfl: 3    omeprazole (PriLOSEC) 20 mg capsule, Take 1 capsule (20 mg total) by mouth daily before breakfast., Disp: 90 capsule, Rfl: 1    rosuvastatin (CRESTOR) 10 mg tablet, TAKE 1 TABLET BY MOUTH EVERY DAY, Disp: 90 tablet, Rfl: 3    zinc 50 mg tablet, , Disp: , Rfl:     Review of Systems   Constitutional: Negative.    HENT:  Positive for hearing loss. Negative for ear discharge and ear pain.    All other systems reviewed and are negative.      Objective   Vitals:    08/01/24 0909   BP: 128/72   BP Location: Left upper arm   Patient Position: Sitting   Pulse: 70   Resp: 16   Temp: 36.3 °C (97.3 °F)   TempSrc: Temporal   SpO2: 98%   Weight: 65.8 kg (145 lb)   Height: 1.524 m (5')     Body mass index is 28.32 kg/m².    Physical Exam  Vitals reviewed.   Constitutional:       Appearance: Normal appearance.   HENT:      Head: Normocephalic and atraumatic.      Right Ear: Tympanic membrane and external ear normal. There is impacted cerumen.      Left Ear: Tympanic membrane and external ear normal. There is impacted cerumen.   Neurological:      Mental Status: She is alert.       ASSESSMENT/PLAN:  Diagnoses and all orders for this visit:    Bilateral impacted cerumen  -     Ear Cerumen Removal      The risks and benefits of taking the above medication(s) were reviewed with the patient.  Dosing options and how to take the medication(s) were discussed.  Possible common side effects were reviewed as well.    The pt voiced understanding to the plan as outlined above.      KELLIE Ruiz  C  8/1/2024

## 2024-08-05 ENCOUNTER — OFFICE VISIT (OUTPATIENT)
Dept: PRIMARY CARE | Facility: CLINIC | Age: 69
End: 2024-08-05
Payer: MEDICARE

## 2024-08-05 VITALS
BODY MASS INDEX: 28.47 KG/M2 | TEMPERATURE: 97.4 F | DIASTOLIC BLOOD PRESSURE: 74 MMHG | WEIGHT: 145 LBS | HEIGHT: 60 IN | SYSTOLIC BLOOD PRESSURE: 128 MMHG | HEART RATE: 71 BPM | RESPIRATION RATE: 16 BRPM | OXYGEN SATURATION: 98 %

## 2024-08-05 DIAGNOSIS — J01.90 ACUTE NON-RECURRENT SINUSITIS, UNSPECIFIED LOCATION: Primary | ICD-10-CM

## 2024-08-05 PROCEDURE — G8754 DIAS BP LESS 90: HCPCS | Performed by: PHYSICIAN ASSISTANT

## 2024-08-05 PROCEDURE — G8752 SYS BP LESS 140: HCPCS | Performed by: PHYSICIAN ASSISTANT

## 2024-08-05 PROCEDURE — 99214 OFFICE O/P EST MOD 30 MIN: CPT | Performed by: PHYSICIAN ASSISTANT

## 2024-08-05 RX ORDER — DOXYCYCLINE HYCLATE 100 MG
100 TABLET ORAL 2 TIMES DAILY
Qty: 14 TABLET | Refills: 0 | Status: SHIPPED | OUTPATIENT
Start: 2024-08-05 | End: 2024-08-12

## 2024-08-05 NOTE — PROGRESS NOTES
Janette Jordan PA-C  Family Medicine in [address]  [place] [address]  Phone: [phone]       Cristina So is a 69 y.o. female who presents today for   Chief Complaint   Patient presents with    Sinus Problem     Re-evaluation, still having sx       Presents today for acute visit   Has had sinus symptoms for the past several weeks   Finished a course of azithromycin   No fever         Past Medical History:   Diagnosis Date    Allergies     Arthritis     Asthma     Basal cell carcinoma     Chronic maxillary sinusitis 2020    Herpes genitalia     Hypertension     Lipid disorder        Past Surgical History:   Procedure Laterality Date     SECTION      CHOLECYSTECTOMY      EYE SURGERY      HYSTERECTOMY      SINUS SURGERY      TONSILLECTOMY      UTERINE FIBROID EMBOLIZATION         Social History     Tobacco Use    Smoking status: Never     Passive exposure: Never    Smokeless tobacco: Never   Vaping Use    Vaping Use: Never used   Substance Use Topics    Alcohol use: Yes     Comment: Occasionally    Drug use: Not Currently       Family History   Problem Relation Age of Onset    Lung cancer Biological Mother     Hyperlipidemia Biological Father     Hypertension Biological Father     Heart disease Biological Father     Asthma Biological Sister     Heart disease Maternal Grandfather     No Known Problems Biological Sister     No Known Problems Biological Sister        Ampicillin, Atenolol, Cefdinir, and Lisinopril      Current Outpatient Medications:     albuterol HFA 90 mcg/actuation inhaler, Inhale 2 puffs every 6 (six) hours as needed for wheezing or shortness of breath., Disp: 18 g, Rfl: 1    cholecalciferol, vitamin D3, (VITAMIN D3) 100 mcg (4,000 unit) capsule, Take 4,000 Units by mouth daily., Disp: , Rfl:     cyanocobalamin (VITAMIN B12) 500 mcg tablet, , Disp: , Rfl:     fluticasone propionate (FLONASE) 50 mcg/actuation nasal spray, Administer 1 spray  into each nostril daily., Disp: , Rfl:     hydrochlorothiazide (HYDRODIURIL) 25 mg tablet, TAKE 1 TABLET BY MOUTH EVERY DAY, Disp: 90 tablet, Rfl: 3    methylPREDNISolone (MEDROL DOSEPACK) 4 mg tablet, Follow package directions., Disp: 21 tablet, Rfl: 0    olmesartan (BENICAR) 40 mg tablet, TAKE 1 TABLET BY MOUTH EVERY DAY, Disp: 90 tablet, Rfl: 3    omeprazole (PriLOSEC) 20 mg capsule, Take 1 capsule (20 mg total) by mouth daily before breakfast., Disp: 90 capsule, Rfl: 1    rosuvastatin (CRESTOR) 10 mg tablet, TAKE 1 TABLET BY MOUTH EVERY DAY, Disp: 90 tablet, Rfl: 3    zinc 50 mg tablet, , Disp: , Rfl:     Review of Systems   Constitutional:  Negative for chills, diaphoresis, fatigue and fever.   HENT:  Positive for congestion and sinus pain. Negative for ear pain, postnasal drip, rhinorrhea, sinus pressure and sore throat.    Respiratory:  Positive for cough. Negative for shortness of breath and wheezing.    Cardiovascular:  Negative for chest pain.   Neurological:  Positive for headaches. Negative for dizziness.   All other systems reviewed and are negative.      Objective   Vitals:    08/05/24 1300   BP: 128/74   BP Location: Left upper arm   Patient Position: Sitting   Pulse: 71   Resp: 16   Temp: 36.3 °C (97.4 °F)   TempSrc: Temporal   SpO2: 98%   Weight: 65.8 kg (145 lb)   Height: 1.524 m (5')     Body mass index is 28.32 kg/m².    Physical Exam  Vitals reviewed.   Constitutional:       Appearance: Normal appearance.   HENT:      Head: Normocephalic and atraumatic.      Right Ear: Tympanic membrane, ear canal and external ear normal.      Left Ear: Tympanic membrane, ear canal and external ear normal.      Nose: Nose normal. No congestion or rhinorrhea.      Mouth/Throat:      Pharynx: Oropharynx is clear. No oropharyngeal exudate or posterior oropharyngeal erythema.   Eyes:      Conjunctiva/sclera: Conjunctivae normal.   Cardiovascular:      Rate and Rhythm: Normal rate and regular rhythm.      Heart  sounds: Normal heart sounds. No murmur heard.  Pulmonary:      Effort: Pulmonary effort is normal. No respiratory distress.      Breath sounds: Normal breath sounds.   Lymphadenopathy:      Cervical: No cervical adenopathy.   Neurological:      General: No focal deficit present.      Mental Status: She is alert and oriented to person, place, and time.   Psychiatric:         Mood and Affect: Mood normal.         Behavior: Behavior normal.       ASSESSMENT/PLAN:  Diagnoses and all orders for this visit:    Acute non-recurrent sinusitis, unspecified location    Other orders  -     doxycycline hyclate (VIBRA-TABS) 100 mg tablet; Take 1 tablet (100 mg total) by mouth 2 (two) times a day for 7 days.      The risks and benefits of taking the above medication(s) were reviewed with the patient.  Dosing options and how to take the medication(s) were discussed.  Possible common side effects were reviewed as well.    The pt voiced understanding to the plan as outlined above.      KELLIE Ruiz  8/5/2024

## 2024-08-08 ASSESSMENT — ENCOUNTER SYMPTOMS
HEADACHES: 1
SINUS PRESSURE: 0
DIZZINESS: 0
FATIGUE: 0
SHORTNESS OF BREATH: 0
DIAPHORESIS: 0
COUGH: 1
SORE THROAT: 0
FEVER: 0
CHILLS: 0
WHEEZING: 0
SINUS PAIN: 1
RHINORRHEA: 0

## 2024-09-08 RX ORDER — HYDROCHLOROTHIAZIDE 25 MG/1
25 TABLET ORAL DAILY
Qty: 90 TABLET | Refills: 3 | Status: SHIPPED | OUTPATIENT
Start: 2024-09-08

## 2024-09-30 RX ORDER — ROSUVASTATIN CALCIUM 10 MG/1
10 TABLET, COATED ORAL DAILY
Qty: 90 TABLET | Refills: 3 | Status: SHIPPED | OUTPATIENT
Start: 2024-09-30

## 2024-09-30 NOTE — TELEPHONE ENCOUNTER
"Medicine Refill Request    Last Office Visit: 8/5/2024   Last Consult Visit: Visit date not found  Last Telemedicine Visit: Visit date not found    Next Appointment: Visit date not found      Current Outpatient Medications:     albuterol HFA 90 mcg/actuation inhaler, Inhale 2 puffs every 6 (six) hours as needed for wheezing or shortness of breath., Disp: 18 g, Rfl: 1    cholecalciferol, vitamin D3, (VITAMIN D3) 100 mcg (4,000 unit) capsule, Take 4,000 Units by mouth daily., Disp: , Rfl:     cyanocobalamin (VITAMIN B12) 500 mcg tablet, , Disp: , Rfl:     fluticasone propionate (FLONASE) 50 mcg/actuation nasal spray, Administer 1 spray into each nostril daily., Disp: , Rfl:     hydrochlorothiazide (HYDRODIURIL) 25 mg tablet, TAKE 1 TABLET BY MOUTH EVERY DAY, Disp: 90 tablet, Rfl: 3    olmesartan (BENICAR) 40 mg tablet, TAKE 1 TABLET BY MOUTH EVERY DAY, Disp: 90 tablet, Rfl: 3    omeprazole (PriLOSEC) 20 mg capsule, Take 1 capsule (20 mg total) by mouth daily before breakfast., Disp: 90 capsule, Rfl: 1    rosuvastatin (CRESTOR) 10 mg tablet, TAKE 1 TABLET BY MOUTH EVERY DAY, Disp: 90 tablet, Rfl: 3    zinc 50 mg tablet, , Disp: , Rfl:       BP Readings from Last 3 Encounters:   08/05/24 128/74   08/01/24 128/72   07/23/24 130/70       Recent Lab results:  Lab Results   Component Value Date    CHOL 152 04/17/2023   ,   Lab Results   Component Value Date    HDL 57 04/17/2023   ,   Lab Results   Component Value Date    LDLCALC 68 04/17/2023   ,   Lab Results   Component Value Date    TRIG 134 04/17/2023        Lab Results   Component Value Date    GLUCOSE 103 (H) 04/17/2023   , No results found for: \"HGBA1C\"      Lab Results   Component Value Date    CREATININE 0.7 04/17/2023       Lab Results   Component Value Date    TSH 2.16 04/17/2023         No results found for: \"HGBA1C\"  "

## 2024-12-26 ENCOUNTER — HOSPITAL ENCOUNTER (OUTPATIENT)
Facility: CLINIC | Age: 69
Discharge: HOME | End: 2024-12-26
Attending: FAMILY MEDICINE
Payer: MEDICARE

## 2024-12-26 VITALS
HEART RATE: 71 BPM | WEIGHT: 140 LBS | DIASTOLIC BLOOD PRESSURE: 83 MMHG | RESPIRATION RATE: 14 BRPM | BODY MASS INDEX: 26.43 KG/M2 | TEMPERATURE: 98.2 F | SYSTOLIC BLOOD PRESSURE: 145 MMHG | OXYGEN SATURATION: 95 % | HEIGHT: 61 IN

## 2024-12-26 DIAGNOSIS — J22 LOWER RESPIRATORY TRACT INFECTION: Primary | ICD-10-CM

## 2024-12-26 PROCEDURE — 99213 OFFICE O/P EST LOW 20 MIN: CPT | Performed by: NURSE PRACTITIONER

## 2024-12-26 RX ORDER — LEVOFLOXACIN 750 MG/1
750 TABLET ORAL DAILY
Qty: 5 TABLET | Refills: 0 | Status: SHIPPED | OUTPATIENT
Start: 2024-12-26 | End: 2025-01-02 | Stop reason: ALTCHOICE

## 2024-12-26 RX ORDER — DOXYCYCLINE 100 MG/1
100 CAPSULE ORAL 2 TIMES DAILY
Qty: 14 CAPSULE | Refills: 0 | Status: SHIPPED | OUTPATIENT
Start: 2024-12-26 | End: 2024-12-26 | Stop reason: ALTCHOICE

## 2024-12-26 ASSESSMENT — ENCOUNTER SYMPTOMS
SHORTNESS OF BREATH: 0
MYALGIAS: 1
HEADACHES: 1
BACK PAIN: 1
FATIGUE: 0
COUGH: 1
SINUS PAIN: 0
FEVER: 0
CARDIOVASCULAR NEGATIVE: 1
STRIDOR: 0
SINUS PRESSURE: 0
CHILLS: 0

## 2024-12-26 NOTE — ED ATTESTATION NOTE
I was immediately available to provide supervision and direction for the care of the patient.    The patient was evaluated and managed by the nurse practitioner.       Sharif Mcmahon,   12/26/24 6235

## 2024-12-26 NOTE — DISCHARGE INSTRUCTIONS
I am suspecting you have pneumonia of the right lower lobe.  Begin Levaquin, 1 tablet daily for 5 days.  Take this medications with food.  Stay well-hydrated.  Follow-up with your primary care provider if your symptoms do not improve.

## 2024-12-26 NOTE — ED PROVIDER NOTES
Emergency Medicine Note  HPI   HISTORY OF PRESENT ILLNESS     68 y/o female presents today with c/o dry cough, nasal congestion and intermittent headaches x 3 weeks.  Using Claritin, Mucinex and Flonase daily  Denies CP, SOB or fevers.  States she started to experience right mid/lower back pain just below her shoulder blade that is constant in nature.        P          Patient History   PAST HISTORY     Reviewed from Nursing Triage:       Past Medical History:   Diagnosis Date    Allergies     Arthritis     Asthma     Basal cell carcinoma     Chronic maxillary sinusitis 2020    Herpes genitalia     Hypertension     Lipid disorder        Past Surgical History   Procedure Laterality Date     section      Cholecystectomy      Eye surgery      Hysterectomy      Sinus surgery      Tonsillectomy      Uterine fibroid embolization         Family History   Problem Relation Name Age of Onset    Lung cancer Biological Mother      Hyperlipidemia Biological Father      Hypertension Biological Father      Heart disease Biological Father      Asthma Biological Sister      Heart disease Maternal Grandfather      No Known Problems Biological Sister      No Known Problems Biological Sister         Social History     Tobacco Use    Smoking status: Never     Passive exposure: Never    Smokeless tobacco: Never   Vaping Use    Vaping status: Never Used   Substance Use Topics    Alcohol use: Yes     Comment: Occasionally    Drug use: Never         Review of Systems   REVIEW OF SYSTEMS     Review of Systems   Constitutional:  Negative for chills, fatigue and fever.   HENT:  Positive for congestion. Negative for sinus pressure and sinus pain.    Respiratory:  Positive for cough. Negative for shortness of breath and stridor.    Cardiovascular: Negative.    Musculoskeletal:  Positive for back pain and myalgias.   Neurological:  Positive for headaches.         VITALS     ED Vitals      Date/Time Temp Pulse Resp BP SpO2 Who    12/26/24 1436 36.8 °C (98.2 °F) 71 14 145/83 95 % MG                         Physical Exam   PHYSICAL EXAM     Physical Exam  Vitals reviewed.   Constitutional:       Appearance: Normal appearance.   Cardiovascular:      Rate and Rhythm: Normal rate and regular rhythm.      Heart sounds: Normal heart sounds, S1 normal and S2 normal.   Pulmonary:      Breath sounds: Examination of the right-lower field reveals rales. Rales present.      Comments: +Rales of RLL, unable to clear  Pain is located directed over the Right lower lobe   Musculoskeletal:        Arms:    Neurological:      Mental Status: She is alert and oriented to person, place, and time.   Psychiatric:         Mood and Affect: Mood normal.         Behavior: Behavior normal.         Thought Content: Thought content normal.         Judgment: Judgment normal.           PROCEDURES     Procedures     DATA     Results       None                No orders to display       Scoring tools                                  ED Course & MDM   MDM / ED COURSE / CLINICAL IMPRESSION / DISPO     Medical Decision Making  URI symptoms x 3 weeks with recent Right mid/lower back pain  Denies CP, SOB or difficulty breathing   New onset of right mid/lower back pain  +rales of RLL, directly over endorsed back pain  Suspected CAP  Rx Levaquin daily x 5 days ( PCN/cephalosporin allergy--multiple drug drug interactions) prompting use of fluoroquinolone  Recommended to f/u with PCP if no improvement.            Clinical Impression      None                 Emeli Anand CRNP  12/26/24 1611

## 2025-01-02 ENCOUNTER — OFFICE VISIT (OUTPATIENT)
Dept: PRIMARY CARE | Facility: CLINIC | Age: 70
End: 2025-01-02
Payer: MEDICARE

## 2025-01-02 VITALS
SYSTOLIC BLOOD PRESSURE: 138 MMHG | HEIGHT: 61 IN | BODY MASS INDEX: 27 KG/M2 | RESPIRATION RATE: 16 BRPM | DIASTOLIC BLOOD PRESSURE: 86 MMHG | TEMPERATURE: 97.3 F | OXYGEN SATURATION: 97 % | WEIGHT: 143 LBS | HEART RATE: 80 BPM

## 2025-01-02 DIAGNOSIS — J18.9 PNEUMONIA DUE TO INFECTIOUS ORGANISM, UNSPECIFIED LATERALITY, UNSPECIFIED PART OF LUNG: ICD-10-CM

## 2025-01-02 DIAGNOSIS — I10 PRIMARY HYPERTENSION: Primary | ICD-10-CM

## 2025-01-02 DIAGNOSIS — M85.89 OSTEOPENIA OF MULTIPLE SITES: ICD-10-CM

## 2025-01-02 PROCEDURE — G8754 DIAS BP LESS 90: HCPCS | Performed by: FAMILY MEDICINE

## 2025-01-02 PROCEDURE — 99214 OFFICE O/P EST MOD 30 MIN: CPT | Performed by: FAMILY MEDICINE

## 2025-01-02 PROCEDURE — G8752 SYS BP LESS 140: HCPCS | Performed by: FAMILY MEDICINE

## 2025-01-02 RX ORDER — OMEPRAZOLE 20 MG/1
20 CAPSULE, DELAYED RELEASE ORAL
Qty: 90 CAPSULE | Refills: 1 | Status: SHIPPED | OUTPATIENT
Start: 2025-01-02

## 2025-01-02 ASSESSMENT — ENCOUNTER SYMPTOMS
BACK PAIN: 0
EYE DISCHARGE: 0
NERVOUS/ANXIOUS: 0
CHEST TIGHTNESS: 0
ADENOPATHY: 0
SHORTNESS OF BREATH: 0
ABDOMINAL PAIN: 0
ARTHRALGIAS: 0
DIFFICULTY URINATING: 0
APPETITE CHANGE: 0
FATIGUE: 0
PALPITATIONS: 0
DECREASED CONCENTRATION: 0
FLANK PAIN: 0
SINUS PAIN: 0
HEADACHES: 0
EYE PAIN: 0
SLEEP DISTURBANCE: 0
TROUBLE SWALLOWING: 0
DIZZINESS: 0

## 2025-01-02 NOTE — PROGRESS NOTES
Daily Progress Note      Subjective      Patient ID: Cristina So is a 69 y.o. female.    HPI  For review/discussion re px. Resolved sx's  For med check, diagnosis discussion  Doing well on current rx  No new c/o, compliant  For review gerd, rx  Bp review    The following have been reviewed and updated as appropriate in this visit:   Problems       Review of Systems   Constitutional:  Negative for appetite change and fatigue.   HENT:  Negative for ear pain, sinus pain and trouble swallowing.    Eyes:  Negative for pain and discharge.   Respiratory:  Negative for chest tightness and shortness of breath.    Cardiovascular:  Negative for chest pain and palpitations.   Gastrointestinal:  Negative for abdominal pain.   Genitourinary:  Negative for difficulty urinating, flank pain, menstrual problem and vaginal bleeding.   Musculoskeletal:  Negative for arthralgias, back pain and gait problem.   Skin:  Negative for rash.   Neurological:  Negative for dizziness, syncope and headaches.   Hematological:  Negative for adenopathy.   Psychiatric/Behavioral:  Negative for decreased concentration and sleep disturbance. The patient is not nervous/anxious.        Current Outpatient Medications   Medication Sig Dispense Refill    albuterol HFA 90 mcg/actuation inhaler Inhale 2 puffs every 6 (six) hours as needed for wheezing or shortness of breath. 18 g 1    cholecalciferol, vitamin D3, (VITAMIN D3) 100 mcg (4,000 unit) capsule Take 4,000 Units by mouth daily.      cyanocobalamin (VITAMIN B12) 500 mcg tablet       fluticasone propionate (FLONASE) 50 mcg/actuation nasal spray Administer 1 spray into each nostril daily.      hydrochlorothiazide (HYDRODIURIL) 25 mg tablet TAKE 1 TABLET BY MOUTH EVERY DAY 90 tablet 3    olmesartan (BENICAR) 40 mg tablet TAKE 1 TABLET BY MOUTH EVERY DAY 90 tablet 3    omeprazole (PriLOSEC) 20 mg capsule Take 1 capsule (20 mg total) by mouth daily before breakfast. 90 capsule 1    rosuvastatin  (CRESTOR) 10 mg tablet TAKE 1 TABLET BY MOUTH EVERY DAY 90 tablet 3    zinc 50 mg tablet        No current facility-administered medications for this visit.     Past Medical History:   Diagnosis Date    Asthma     Herpes genitalia     Hypertension      Family History   Problem Relation Name Age of Onset    Lung cancer Biological Mother      Hyperlipidemia Biological Father      Hypertension Biological Father      Heart disease Biological Father      Asthma Biological Sister      Heart disease Maternal Grandfather      No Known Problems Biological Sister      No Known Problems Biological Sister       Past Surgical History   Procedure Laterality Date     section      Cholecystectomy      Eye surgery      Hysterectomy      Sinus surgery      Tonsillectomy      Uterine fibroid embolization       Social History     Socioeconomic History    Marital status:      Spouse name: Not on file    Number of children: Not on file    Years of education: Not on file    Highest education level: Not on file   Occupational History    Occupation: Retired   Tobacco Use    Smoking status: Never     Passive exposure: Never    Smokeless tobacco: Never   Vaping Use    Vaping status: Never Used   Substance and Sexual Activity    Alcohol use: Yes     Comment: Occasionally    Drug use: Never    Sexual activity: Yes     Partners: Male     Birth control/protection: Female Sterilization/Tubes Tied   Other Topics Concern    Not on file   Social History Narrative    Not on file     Social Drivers of Health     Financial Resource Strain: Not on file   Food Insecurity: Not on file   Transportation Needs: Not on file   Physical Activity: Not on file   Stress: Not on file   Social Connections: Not on file   Intimate Partner Violence: Not on file   Housing Stability: Not on file     Allergies   Allergen Reactions    Ampicillin Rash    Atenolol      Can't remember reaction but it was not severe.    Cefdinir Rash    Lisinopril      Can't  remember reaction but it was not severe.       Objective   I have reviewed the patient's pertinent labs. Pertinent labs are within normal limits.    Physical Exam  Constitutional:       Appearance: Normal appearance. She is well-developed.   HENT:      Head: Normocephalic and atraumatic.      Right Ear: Tympanic membrane and ear canal normal.      Left Ear: Tympanic membrane and ear canal normal.      Nose: Nose normal.      Mouth/Throat:      Pharynx: Oropharynx is clear.   Eyes:      General: Lids are normal.      Conjunctiva/sclera: Conjunctivae normal.      Pupils: Pupils are equal, round, and reactive to light.   Neck:      Trachea: Trachea normal.   Cardiovascular:      Rate and Rhythm: Normal rate and regular rhythm.      Heart sounds: Normal heart sounds.   Pulmonary:      Effort: Pulmonary effort is normal.      Breath sounds: Normal breath sounds. No wheezing.   Abdominal:      General: Bowel sounds are normal.      Palpations: Abdomen is soft. There is no mass.      Tenderness: There is no abdominal tenderness. There is no guarding or rebound.   Musculoskeletal:         General: Normal range of motion.      Cervical back: Full passive range of motion without pain and normal range of motion.   Lymphadenopathy:      Cervical: No cervical adenopathy.   Skin:     General: Skin is warm and dry.   Neurological:      General: No focal deficit present.      Mental Status: She is alert and oriented to person, place, and time.   Psychiatric:         Speech: Speech normal.         Behavior: Behavior normal.         Thought Content: Thought content normal.         Judgment: Judgment normal.         Assessment/Plan     Problem List Items Addressed This Visit       Primary hypertension - Primary    Relevant Orders    TSH w reflex FT4    Comprehensive metabolic panel    CBC and differential    Lipid panel    Osteopenia of multiple sites    Relevant Orders    DEXA BONE DENSITY    TSH w reflex FT4    Comprehensive  metabolic panel    CBC and differential    Lipid panel     Other Visit Diagnoses       Pneumonia due to infectious organism, unspecified laterality, unspecified part of lung        Relevant Orders    TSH w reflex FT4    Comprehensive metabolic panel    CBC and differential    Lipid panel          Orders Placed This Encounter   Procedures    DEXA BONE DENSITY     Standing Status:   Future     Standing Expiration Date:   1/2/2026     Order Specific Question:   Release to patient     Answer:   Immediate [1]    TSH w reflex FT4     Standing Status:   Future     Standing Expiration Date:   1/2/2026     Order Specific Question:   Release to patient     Answer:   Immediate [1]    Comprehensive metabolic panel     Standing Status:   Future     Standing Expiration Date:   1/2/2026     Order Specific Question:   Release to patient     Answer:   Immediate [1]    CBC and differential     Standing Status:   Future     Standing Expiration Date:   1/2/2026     Order Specific Question:   Release to patient     Answer:   Immediate [1]    Lipid panel     Standing Status:   Future     Standing Expiration Date:   1/2/2026     Order Specific Question:   Release to patient     Answer:   Immediate [1]     Reviewed rx, dx, tx, h,x labs  Will repeat labs  Stable on current, c/w same  Will dexa, discussed  Will ppi, discussed  C/w bp diary      Jed Hines, DO  1/2/2025

## 2025-01-25 ENCOUNTER — HOSPITAL ENCOUNTER (OUTPATIENT)
Facility: CLINIC | Age: 70
Discharge: HOME | End: 2025-01-25
Attending: FAMILY MEDICINE
Payer: MEDICARE

## 2025-01-25 ENCOUNTER — APPOINTMENT (OUTPATIENT)
Dept: RADIOLOGY | Age: 70
End: 2025-01-25
Attending: FAMILY MEDICINE
Payer: MEDICARE

## 2025-01-25 VITALS
HEART RATE: 85 BPM | TEMPERATURE: 98.1 F | OXYGEN SATURATION: 96 % | SYSTOLIC BLOOD PRESSURE: 110 MMHG | DIASTOLIC BLOOD PRESSURE: 82 MMHG

## 2025-01-25 DIAGNOSIS — J06.9 VIRAL UPPER RESPIRATORY TRACT INFECTION: Primary | ICD-10-CM

## 2025-01-25 LAB
EXPIRATION DATE: NORMAL
FLUAV RNA SPEC QL NAA+PROBE: NEGATIVE
FLUBV RNA SPEC QL NAA+PROBE: NEGATIVE
Lab: NORMAL
POCT MANUFACTURER: NORMAL
RAPID INFLUENZA A AGN: NEGATIVE
RAPID INFLUENZA B AGN: NEGATIVE
RSV RNA SPEC QL NAA+PROBE: NEGATIVE
SARS-COV-2 RNA RESP QL NAA+PROBE: POSITIVE

## 2025-01-25 PROCEDURE — 87637 SARSCOV2&INF A&B&RSV AMP PRB: CPT | Performed by: FAMILY MEDICINE

## 2025-01-25 PROCEDURE — 71046 X-RAY EXAM CHEST 2 VIEWS: CPT | Performed by: FAMILY MEDICINE

## 2025-01-25 PROCEDURE — 99214 OFFICE O/P EST MOD 30 MIN: CPT | Performed by: FAMILY MEDICINE

## 2025-01-25 PROCEDURE — 87804 INFLUENZA ASSAY W/OPTIC: CPT | Mod: QW | Performed by: FAMILY MEDICINE

## 2025-01-25 ASSESSMENT — ENCOUNTER SYMPTOMS
DIARRHEA: 1
VOMITING: 1
WHEEZING: 0
APPETITE CHANGE: 1
COUGH: 1
FEVER: 0
SORE THROAT: 1
NAUSEA: 0
ABDOMINAL PAIN: 0
SHORTNESS OF BREATH: 0
CHILLS: 1

## 2025-01-25 NOTE — DISCHARGE INSTRUCTIONS
Rapid flu negative  Chest x-ray normal  COVID swab sent  Rest and lots of fluids  Mucinex DM or Delsym as needed for cough  Continue Flonase nasal spray, 2 sprays in each nostril daily  Follow-up with primary care physician if symptoms persist

## 2025-01-25 NOTE — ED PROVIDER NOTES
History  Chief Complaint   Patient presents with    URI     Since Tuesday, had chills, nasal congestion cough, fatigue, lack of appetite, vomitting- wed/thurs;  diarrhea- this morning, lightheaded- has been all week   Lost 5 lbs since Tuesday.  Was able to eat chicken soup yesterday.       Patient started with cold symptoms 5 days ago.  She has cough and congestion.  She has no fever.  She has a sore throat and postnasal drip.  She had a negative Covid test at home.  There have been no known ill contacts or known exposures.  She had an episode of vomiting a few days ago.  She had some loose stool today.  She has no abdominal pain.  She does have decreased appetite and states she lost 5 pounds in the last few days.      URI  Presenting symptoms: congestion, cough and sore throat    Presenting symptoms: no fever    Severity:  Moderate  Onset quality:  Gradual  Duration:  5 days  Progression:  Unchanged  Chronicity:  New  Worsened by:  Nothing  Associated symptoms: no wheezing    Risk factors: no recent travel and no sick contacts        Past Medical History:   Diagnosis Date    Asthma     Herpes genitalia     Hypertension        Past Surgical History   Procedure Laterality Date     section      Cholecystectomy      Eye surgery      Hysterectomy      Sinus surgery      Tonsillectomy      Uterine fibroid embolization         Family History   Problem Relation Name Age of Onset    Lung cancer Biological Mother      Hyperlipidemia Biological Father      Hypertension Biological Father      Heart disease Biological Father      Asthma Biological Sister      Heart disease Maternal Grandfather      No Known Problems Biological Sister      No Known Problems Biological Sister         Social History     Tobacco Use    Smoking status: Never     Passive exposure: Never    Smokeless tobacco: Never   Vaping Use    Vaping status: Never Used   Substance Use Topics    Alcohol use: Yes     Comment: Occasionally    Drug use: Never        Review of Systems   Constitutional:  Positive for appetite change and chills. Negative for fever.   HENT:  Positive for congestion, postnasal drip and sore throat.    Respiratory:  Positive for cough. Negative for shortness of breath and wheezing.    Gastrointestinal:  Positive for diarrhea and vomiting. Negative for abdominal pain and nausea.       Physical Exam  ED Triage Vitals [01/25/25 0821]   Temp Heart Rate Resp BP SpO2   36.7 °C (98.1 °F) 85 -- 110/82 96 %      Temp src Heart Rate Source Patient Position BP Location FiO2 (%) (Set)   -- -- Sitting Left upper arm --       Physical Exam  Vitals reviewed.   Constitutional:       General: She is not in acute distress.     Appearance: Normal appearance. She is not ill-appearing.   HENT:      Right Ear: Tympanic membrane, ear canal and external ear normal.      Left Ear: Tympanic membrane, ear canal and external ear normal.      Mouth/Throat:      Mouth: Mucous membranes are moist.      Pharynx: Postnasal drip present. No posterior oropharyngeal erythema.      Tonsils: No tonsillar exudate.   Cardiovascular:      Rate and Rhythm: Normal rate and regular rhythm.      Heart sounds: Normal heart sounds.   Pulmonary:      Effort: Pulmonary effort is normal. No tachypnea or respiratory distress.      Breath sounds: Normal breath sounds. No decreased breath sounds, wheezing or rhonchi.   Abdominal:      General: Bowel sounds are normal. There is no distension.      Palpations: Abdomen is soft.      Tenderness: There is no abdominal tenderness. There is no right CVA tenderness, left CVA tenderness, guarding or rebound.   Musculoskeletal:      Cervical back: Neck supple.   Lymphadenopathy:      Cervical: No cervical adenopathy.   Neurological:      Mental Status: She is alert.           Procedures  Procedures    UC Course   Viral URI    Medical Decision Making  Rapid flu negative  Chest x-ray normal  COVID swab sent  Rest and lots of fluids  Mucinex DM or Delsym as  needed for cough  Continue Flonase nasal spray, 2 sprays in each nostril daily  Follow-up with primary care physician if symptoms persist                 Iwona Beebe,   01/25/25 0913       Iwona eBebe,   01/25/25 0914

## 2025-01-25 NOTE — Clinical Note
Cristina So was seen and treated in our urgent care on 1/25/2025.  She may return to work on 01/27/2025.       If you have any questions or concerns, please don't hesitate to call.      Iwona Beebe, DO

## 2025-02-25 ENCOUNTER — OFFICE VISIT (OUTPATIENT)
Dept: PRIMARY CARE | Facility: CLINIC | Age: 70
End: 2025-02-25
Payer: MEDICARE

## 2025-02-25 VITALS
WEIGHT: 139.6 LBS | HEART RATE: 104 BPM | TEMPERATURE: 97.3 F | HEIGHT: 61 IN | RESPIRATION RATE: 18 BRPM | OXYGEN SATURATION: 96 % | SYSTOLIC BLOOD PRESSURE: 124 MMHG | DIASTOLIC BLOOD PRESSURE: 64 MMHG | BODY MASS INDEX: 26.36 KG/M2

## 2025-02-25 DIAGNOSIS — R05.9 COUGH, UNSPECIFIED TYPE: ICD-10-CM

## 2025-02-25 DIAGNOSIS — R50.9 FEVER, UNSPECIFIED FEVER CAUSE: ICD-10-CM

## 2025-02-25 DIAGNOSIS — J10.1 INFLUENZA A: Primary | ICD-10-CM

## 2025-02-25 LAB
EXPIRATION DATE: ABNORMAL
Lab: ABNORMAL
POCT MANUFACTURER: ABNORMAL
RAPID INFLUENZA A AGN: POSITIVE
RAPID INFLUENZA B AGN: NEGATIVE

## 2025-02-25 PROCEDURE — 99214 OFFICE O/P EST MOD 30 MIN: CPT | Performed by: PHYSICIAN ASSISTANT

## 2025-02-25 PROCEDURE — G8754 DIAS BP LESS 90: HCPCS | Performed by: PHYSICIAN ASSISTANT

## 2025-02-25 PROCEDURE — G8752 SYS BP LESS 140: HCPCS | Performed by: PHYSICIAN ASSISTANT

## 2025-02-25 PROCEDURE — 87804 INFLUENZA ASSAY W/OPTIC: CPT | Mod: QW | Performed by: PHYSICIAN ASSISTANT

## 2025-02-25 RX ORDER — OSELTAMIVIR PHOSPHATE 75 MG/1
75 CAPSULE ORAL 2 TIMES DAILY
Qty: 10 CAPSULE | Refills: 0 | Status: SHIPPED | OUTPATIENT
Start: 2025-02-25 | End: 2025-03-02

## 2025-02-25 RX ORDER — BUDESONIDE AND FORMOTEROL FUMARATE DIHYDRATE 160; 4.5 UG/1; UG/1
2 AEROSOL RESPIRATORY (INHALATION) 2 TIMES DAILY
Qty: 10.2 G | Refills: 5 | Status: SHIPPED | OUTPATIENT
Start: 2025-02-25 | End: 2025-02-25

## 2025-02-25 ASSESSMENT — ENCOUNTER SYMPTOMS
RHINORRHEA: 0
DIAPHORESIS: 0
SORE THROAT: 0
HEADACHES: 0
FEVER: 1
CHILLS: 0
DIZZINESS: 0
COUGH: 1
WHEEZING: 0
SHORTNESS OF BREATH: 0
SINUS PRESSURE: 0
MYALGIAS: 1
FATIGUE: 0

## 2025-02-25 ASSESSMENT — PAIN SCALES - GENERAL: PAINLEVEL_OUTOF10: 0-NO PAIN

## 2025-02-25 NOTE — LETTER
February 25, 2025     Patient: Cristina So  YOB: 1955  Date of Visit: 2/25/2025    To Whom it May Concern:    Cristina So was seen in my clinic on 2/25/2025 at 11:30 am. Please excuse Cristina for her absence from work on this day to make the appointment. Please also excuse her on 2/26/25 due to an illness.  She may return on 2/28/25.    If you have any questions or concerns, please don't hesitate to call.         Sincerely,         KELLIE Ruiz        CC: No Recipients

## 2025-02-25 NOTE — PROGRESS NOTES
Janette Jordan PA-C  Family Medicine in [facility]  [address]  [place] [address]  Phone: [phone]       Cristina So is a 69 y.o. female who presents today for   Chief Complaint   Patient presents with    Cough     Sx ongoing since 2024'        Presents today for acute visit   Has a cough which has not really improved since the fall but worse since yesterday  Also now with body aches and fever   102.4 fever at home yesterday   Taking mucinex, claritin, flonase, and delsym  Took tylenol this morning   + sick contacts (works in retail)        Past Medical History:   Diagnosis Date    Asthma     Herpes genitalia     Hypertension        Past Surgical History   Procedure Laterality Date     section      Cholecystectomy      Eye surgery      Hysterectomy      Sinus surgery      Tonsillectomy      Uterine fibroid embolization         Social History     Tobacco Use    Smoking status: Never     Passive exposure: Never    Smokeless tobacco: Never   Vaping Use    Vaping status: Never Used   Substance Use Topics    Alcohol use: Yes     Comment: Occasionally    Drug use: Never       Family History   Problem Relation Name Age of Onset    Lung cancer Biological Mother      Hyperlipidemia Biological Father      Hypertension Biological Father      Heart disease Biological Father      Asthma Biological Sister      Heart disease Maternal Grandfather      No Known Problems Biological Sister      No Known Problems Biological Sister         Ampicillin, Atenolol, Cefdinir, and Lisinopril      Current Outpatient Medications:     cyanocobalamin (VITAMIN B12) 500 mcg tablet, , Disp: , Rfl:     fluticasone propionate (FLONASE) 50 mcg/actuation nasal spray, Administer 1 spray into each nostril daily., Disp: , Rfl:     hydrochlorothiazide (HYDRODIURIL) 25 mg tablet, TAKE 1 TABLET BY MOUTH EVERY DAY, Disp: 90 tablet, Rfl: 3    olmesartan (BENICAR) 40 mg tablet, TAKE 1 TABLET BY MOUTH EVERY DAY,  "Disp: 90 tablet, Rfl: 3    omeprazole (PriLOSEC) 20 mg capsule, Take 1 capsule (20 mg total) by mouth daily before breakfast., Disp: 90 capsule, Rfl: 1    rosuvastatin (CRESTOR) 10 mg tablet, TAKE 1 TABLET BY MOUTH EVERY DAY, Disp: 90 tablet, Rfl: 3    albuterol HFA 90 mcg/actuation inhaler, Inhale 2 puffs every 6 (six) hours as needed for wheezing or shortness of breath., Disp: 18 g, Rfl: 1    cholecalciferol, vitamin D3, (VITAMIN D3) 100 mcg (4,000 unit) capsule, Take 4,000 Units by mouth daily., Disp: , Rfl:     zinc 50 mg tablet, , Disp: , Rfl:     Review of Systems   Constitutional:  Positive for fever. Negative for chills, diaphoresis and fatigue.   HENT:  Negative for congestion, ear pain, postnasal drip, rhinorrhea, sinus pressure and sore throat.    Respiratory:  Positive for cough. Negative for shortness of breath and wheezing.    Cardiovascular:  Negative for chest pain.   Musculoskeletal:  Positive for myalgias.   Neurological:  Negative for dizziness and headaches.   All other systems reviewed and are negative.      Objective   Vitals:    02/25/25 1112   BP: 124/64   Pulse: (!) 104   Resp: 18   Temp: 36.3 °C (97.3 °F)   SpO2: 96%   Weight: 63.3 kg (139 lb 9.6 oz)   Height: 1.549 m (5' 1\")     Body mass index is 26.38 kg/m².    Physical Exam  Vitals reviewed.   Constitutional:       Appearance: Normal appearance.   HENT:      Head: Normocephalic and atraumatic.      Right Ear: Tympanic membrane, ear canal and external ear normal.      Left Ear: Tympanic membrane, ear canal and external ear normal.      Nose: Nose normal. No congestion or rhinorrhea.      Mouth/Throat:      Pharynx: Oropharynx is clear. No oropharyngeal exudate or posterior oropharyngeal erythema.   Eyes:      Conjunctiva/sclera: Conjunctivae normal.   Cardiovascular:      Rate and Rhythm: Normal rate and regular rhythm.      Heart sounds: Normal heart sounds. No murmur heard.  Pulmonary:      Effort: Pulmonary effort is normal. No " respiratory distress.      Breath sounds: Normal breath sounds.   Lymphadenopathy:      Cervical: No cervical adenopathy.   Neurological:      General: No focal deficit present.      Mental Status: She is alert and oriented to person, place, and time.   Psychiatric:         Mood and Affect: Mood normal.         Behavior: Behavior normal.       ASSESSMENT/PLAN:  Diagnoses and all orders for this visit:    Influenza A  -     oseltamivir (TAMIFLU) 75 mg capsule; Take 1 capsule (75 mg total) by mouth 2 (two) times a day for 5 days.    Fever, unspecified fever cause  -     POCT Influenza A/B    Cough, unspecified type      The risks and benefits of taking the above medication(s) were reviewed with the patient.  Dosing options and how to take the medication(s) were discussed.  Possible common side effects were reviewed as well.    The pt voiced understanding to the plan as outlined above.        KELLIE Ruiz  2/25/2025

## 2025-02-25 NOTE — LETTER
February 27, 2025     Patient: Cristina So  YOB: 1955  Date of Visit: 2/25/2025    To Whom it May Concern:    Cristina So was seen in my clinic on 2/25/2025 at 11:30 am. Please excuse Cristina for her absence from work on this day to make the appointment. Please also excuse her on 2/26/25 due to an illness.  She may return on 3/2/25.    If you have any questions or concerns, please don't hesitate to call.           Sincerely,         KELLIE Ruiz        CC: No Recipients

## 2025-03-03 ENCOUNTER — HOSPITAL ENCOUNTER (OUTPATIENT)
Dept: RADIOLOGY | Age: 70
Discharge: HOME | End: 2025-03-03
Attending: FAMILY MEDICINE
Payer: MEDICARE

## 2025-03-03 DIAGNOSIS — M85.89 OSTEOPENIA OF MULTIPLE SITES: ICD-10-CM

## 2025-03-03 PROCEDURE — 77080 DXA BONE DENSITY AXIAL: CPT

## 2025-03-06 ENCOUNTER — APPOINTMENT (OUTPATIENT)
Dept: LAB | Age: 70
End: 2025-03-06
Attending: FAMILY MEDICINE
Payer: MEDICARE

## 2025-03-06 DIAGNOSIS — I10 PRIMARY HYPERTENSION: ICD-10-CM

## 2025-03-06 DIAGNOSIS — M85.89 OSTEOPENIA OF MULTIPLE SITES: ICD-10-CM

## 2025-03-06 DIAGNOSIS — J18.9 PNEUMONIA DUE TO INFECTIOUS ORGANISM, UNSPECIFIED LATERALITY, UNSPECIFIED PART OF LUNG: ICD-10-CM

## 2025-03-06 LAB
ALBUMIN SERPL-MCNC: 4 G/DL (ref 3.5–5.7)
ALP SERPL-CCNC: 71 IU/L (ref 34–125)
ALT SERPL-CCNC: 16 IU/L (ref 7–52)
ANION GAP SERPL CALC-SCNC: 7 MEQ/L (ref 3–15)
AST SERPL-CCNC: 16 IU/L (ref 13–39)
BASOPHILS # BLD: 0.04 K/UL (ref 0.01–0.1)
BASOPHILS NFR BLD: 0.6 %
BILIRUB SERPL-MCNC: 0.4 MG/DL (ref 0.3–1.2)
BUN SERPL-MCNC: 13 MG/DL (ref 7–25)
CALCIUM SERPL-MCNC: 10.5 MG/DL (ref 8.6–10.3)
CHLORIDE SERPL-SCNC: 103 MEQ/L (ref 98–107)
CHOLEST SERPL-MCNC: 154 MG/DL
CO2 SERPL-SCNC: 31 MEQ/L (ref 21–31)
CREAT SERPL-MCNC: 0.7 MG/DL (ref 0.6–1.2)
DIFFERENTIAL METHOD BLD: ABNORMAL
EGFRCR SERPLBLD CKD-EPI 2021: >60 ML/MIN/1.73M*2
EOSINOPHIL # BLD: 0.38 K/UL (ref 0.04–0.36)
EOSINOPHIL NFR BLD: 5.4 %
ERYTHROCYTE [DISTWIDTH] IN BLOOD BY AUTOMATED COUNT: 14.4 % (ref 11.7–14.4)
GLUCOSE SERPL-MCNC: 97 MG/DL (ref 70–99)
HCT VFR BLD AUTO: 41.5 % (ref 35–45)
HDLC SERPL-MCNC: 59 MG/DL
HDLC SERPL: 2.6 {RATIO}
HGB BLD-MCNC: 13.4 G/DL (ref 11.8–15.7)
IMM GRANULOCYTES # BLD AUTO: 0.03 K/UL (ref 0–0.08)
IMM GRANULOCYTES NFR BLD AUTO: 0.4 %
LDLC SERPL CALC-MCNC: 71 MG/DL
LYMPHOCYTES # BLD: 2.25 K/UL (ref 1.2–3.5)
LYMPHOCYTES NFR BLD: 31.7 %
MCH RBC QN AUTO: 27.8 PG (ref 28–33.2)
MCHC RBC AUTO-ENTMCNC: 32.3 G/DL (ref 32.2–35.5)
MCV RBC AUTO: 86.1 FL (ref 83–98)
MONOCYTES # BLD: 0.56 K/UL (ref 0.28–0.8)
MONOCYTES NFR BLD: 7.9 %
NEUTROPHILS # BLD: 3.84 K/UL (ref 1.7–7)
NEUTS SEG NFR BLD: 54 %
NONHDLC SERPL-MCNC: 95 MG/DL
NRBC BLD-RTO: 0 %
PLATELET # BLD AUTO: 267 K/UL (ref 150–369)
PMV BLD AUTO: 11.2 FL (ref 9.4–12.3)
POTASSIUM SERPL-SCNC: 4.4 MEQ/L (ref 3.5–5.1)
PROT SERPL-MCNC: 6.4 G/DL (ref 6–8.2)
RBC # BLD AUTO: 4.82 M/UL (ref 3.93–5.22)
SODIUM SERPL-SCNC: 141 MEQ/L (ref 136–145)
TRIGL SERPL-MCNC: 120 MG/DL
TSH SERPL DL<=0.05 MIU/L-ACNC: 1.47 MIU/L (ref 0.34–5.6)
WBC # BLD AUTO: 7.1 K/UL (ref 3.8–10.5)

## 2025-03-06 PROCEDURE — 36415 COLL VENOUS BLD VENIPUNCTURE: CPT

## 2025-03-06 PROCEDURE — 85025 COMPLETE CBC W/AUTO DIFF WBC: CPT

## 2025-03-06 PROCEDURE — 80061 LIPID PANEL: CPT

## 2025-03-06 PROCEDURE — 80053 COMPREHEN METABOLIC PANEL: CPT

## 2025-03-06 PROCEDURE — 84443 ASSAY THYROID STIM HORMONE: CPT

## 2025-05-02 RX ORDER — ALBUTEROL SULFATE 90 UG/1
2 INHALANT RESPIRATORY (INHALATION) EVERY 6 HOURS PRN
Qty: 18 G | Refills: 1 | Status: SHIPPED | OUTPATIENT
Start: 2025-05-02

## 2025-05-16 ENCOUNTER — HOSPITAL ENCOUNTER (OUTPATIENT)
Facility: CLINIC | Age: 70
Discharge: HOME | End: 2025-05-16
Attending: FAMILY MEDICINE
Payer: MEDICARE

## 2025-05-16 VITALS
SYSTOLIC BLOOD PRESSURE: 122 MMHG | TEMPERATURE: 97.8 F | HEART RATE: 71 BPM | OXYGEN SATURATION: 98 % | DIASTOLIC BLOOD PRESSURE: 72 MMHG

## 2025-05-16 DIAGNOSIS — J01.10 ACUTE FRONTAL SINUSITIS, RECURRENCE NOT SPECIFIED: Primary | ICD-10-CM

## 2025-05-16 PROCEDURE — 99213 OFFICE O/P EST LOW 20 MIN: CPT | Performed by: FAMILY MEDICINE

## 2025-05-16 RX ORDER — AZITHROMYCIN 250 MG/1
TABLET, FILM COATED ORAL
Qty: 6 TABLET | Refills: 0 | Status: SHIPPED | OUTPATIENT
Start: 2025-05-16 | End: 2025-05-21

## 2025-06-07 RX ORDER — OLMESARTAN MEDOXOMIL 40 MG/1
40 TABLET ORAL DAILY
Qty: 90 TABLET | Refills: 3 | Status: SHIPPED | OUTPATIENT
Start: 2025-06-07

## 2025-06-12 ENCOUNTER — HOSPITAL ENCOUNTER (OUTPATIENT)
Dept: RADIOLOGY | Age: 70
Discharge: HOME | End: 2025-06-12
Attending: FAMILY MEDICINE
Payer: MEDICARE

## 2025-06-12 DIAGNOSIS — Z12.31 ENCOUNTER FOR SCREENING MAMMOGRAM FOR MALIGNANT NEOPLASM OF BREAST: ICD-10-CM

## 2025-06-12 PROCEDURE — 77063 BREAST TOMOSYNTHESIS BI: CPT

## 2025-06-26 ENCOUNTER — OFFICE VISIT (OUTPATIENT)
Dept: PRIMARY CARE | Facility: CLINIC | Age: 70
End: 2025-06-26
Payer: MEDICARE

## 2025-06-26 ENCOUNTER — HOSPITAL ENCOUNTER (OUTPATIENT)
Dept: RADIOLOGY | Age: 70
Discharge: HOME | End: 2025-06-26
Attending: FAMILY MEDICINE
Payer: MEDICARE

## 2025-06-26 ENCOUNTER — HOSPITAL ENCOUNTER (OUTPATIENT)
Facility: CLINIC | Age: 70
Discharge: LEFT BEFORE DISCHARGE | End: 2025-06-26
Payer: MEDICARE

## 2025-06-26 VITALS
RESPIRATION RATE: 16 BRPM | SYSTOLIC BLOOD PRESSURE: 130 MMHG | BODY MASS INDEX: 26.24 KG/M2 | TEMPERATURE: 97 F | DIASTOLIC BLOOD PRESSURE: 72 MMHG | HEART RATE: 69 BPM | WEIGHT: 139 LBS | OXYGEN SATURATION: 98 % | HEIGHT: 61 IN

## 2025-06-26 DIAGNOSIS — J32.9 RECURRENT SINUSITIS: ICD-10-CM

## 2025-06-26 DIAGNOSIS — J32.0 CHRONIC MAXILLARY SINUSITIS: ICD-10-CM

## 2025-06-26 DIAGNOSIS — J32.0 CHRONIC MAXILLARY SINUSITIS: Primary | ICD-10-CM

## 2025-06-26 PROBLEM — E78.2 MIXED HYPERLIPIDEMIA: Status: ACTIVE | Noted: 2019-12-04

## 2025-06-26 PROBLEM — R73.01 IMPAIRED FASTING GLUCOSE: Status: ACTIVE | Noted: 2020-01-22

## 2025-06-26 PROBLEM — M15.9 GENERALIZED OSTEOARTHRITIS: Status: ACTIVE | Noted: 2020-01-22

## 2025-06-26 PROBLEM — I70.90 ATHEROSCLEROSIS OF ARTERY: Status: ACTIVE | Noted: 2021-02-16

## 2025-06-26 PROBLEM — K58.9 IRRITABLE BOWEL SYNDROME: Status: ACTIVE | Noted: 2021-01-12

## 2025-06-26 PROBLEM — J45.20 MILD INTERMITTENT ASTHMA: Status: ACTIVE | Noted: 2019-12-04

## 2025-06-26 PROBLEM — E55.9 VITAMIN D DEFICIENCY: Status: ACTIVE | Noted: 2019-12-04

## 2025-06-26 PROCEDURE — 99213 OFFICE O/P EST LOW 20 MIN: CPT | Performed by: FAMILY MEDICINE

## 2025-06-26 PROCEDURE — G8754 DIAS BP LESS 90: HCPCS | Performed by: FAMILY MEDICINE

## 2025-06-26 PROCEDURE — 70486 CT MAXILLOFACIAL W/O DYE: CPT

## 2025-06-26 PROCEDURE — G8752 SYS BP LESS 140: HCPCS | Performed by: FAMILY MEDICINE

## 2025-06-26 RX ORDER — DOXYCYCLINE HYCLATE 100 MG
100 TABLET ORAL 2 TIMES DAILY
Qty: 20 TABLET | Refills: 0 | Status: SHIPPED | OUTPATIENT
Start: 2025-06-26 | End: 2025-07-06

## 2025-06-26 RX ORDER — LORATADINE 10 MG/1
10 TABLET ORAL DAILY
Qty: 30 TABLET | Refills: 0 | Status: SHIPPED | OUTPATIENT
Start: 2025-06-26 | End: 2025-07-26

## 2025-06-26 RX ORDER — PREDNISONE 10 MG/1
10 TABLET ORAL 4 TIMES DAILY
Qty: 16 TABLET | Refills: 0 | Status: SHIPPED | OUTPATIENT
Start: 2025-06-26 | End: 2025-06-30

## 2025-06-26 ASSESSMENT — ENCOUNTER SYMPTOMS
SINUS PRESSURE: 1
COUGH: 1
SINUS PAIN: 1
ARTHRALGIAS: 1
MYALGIAS: 1
RHINORRHEA: 1

## 2025-06-27 ENCOUNTER — RESULTS FOLLOW-UP (OUTPATIENT)
Dept: PRIMARY CARE | Facility: CLINIC | Age: 70
End: 2025-06-27

## 2025-06-27 NOTE — PROGRESS NOTES
Daily Progress Note      Subjective      Patient ID: Cristina So is a 69 y.o. female.    HPI  Cough, congestion, sore throat, pnd  Recurrent x many      The following have been reviewed and updated as appropriate in this visit:   Problems       Review of Systems   HENT:  Positive for congestion, ear pain, postnasal drip, rhinorrhea, sinus pressure and sinus pain.    Respiratory:  Positive for cough.    Musculoskeletal:  Positive for arthralgias and myalgias.       Current Outpatient Medications   Medication Sig Dispense Refill    albuterol HFA 90 mcg/actuation inhaler Inhale 2 puffs every 6 (six) hours as needed for wheezing or shortness of breath. 18 g 1    cholecalciferol, vitamin D3, (VITAMIN D3) 100 mcg (4,000 unit) capsule Take 4,000 Units by mouth daily.      cyanocobalamin (VITAMIN B12) 500 mcg tablet       doxycycline hyclate (VIBRA-TABS) 100 mg tablet Take 1 tablet (100 mg total) by mouth 2 (two) times a day for 10 days. 20 tablet 0    fluticasone propionate (FLONASE) 50 mcg/actuation nasal spray Administer 1 spray into each nostril daily.      hydrochlorothiazide (HYDRODIURIL) 25 mg tablet TAKE 1 TABLET BY MOUTH EVERY DAY 90 tablet 3    loratadine (CLARITIN) 10 mg tablet Take 1 tablet (10 mg total) by mouth daily. 30 tablet 0    olmesartan (BENICAR) 40 mg tablet TAKE 1 TABLET BY MOUTH EVERY DAY 90 tablet 3    omeprazole (PriLOSEC) 20 mg capsule Take 1 capsule (20 mg total) by mouth daily before breakfast. 90 capsule 1    predniSONE (DELTASONE) 10 mg tablet Take 1 tablet (10 mg total) by mouth 4 (four) times a day for 4 days. 16 tablet 0    rosuvastatin (CRESTOR) 10 mg tablet TAKE 1 TABLET BY MOUTH EVERY DAY 90 tablet 3    fluticasone propion-salmeteroL (ADVAIR DISKUS) 100-50 mcg/dose diskus inhaler Inhale 1 puff 2 (two) times a day. 60 each 0     No current facility-administered medications for this visit.     Past Medical History:   Diagnosis Date    Asthma     Herpes genitalia     Hypertension       Family History   Problem Relation Name Age of Onset    Lung cancer Biological Mother      Hyperlipidemia Biological Father      Hypertension Biological Father      Heart disease Biological Father      Asthma Biological Sister      Heart disease Maternal Grandfather      No Known Problems Biological Sister      No Known Problems Biological Sister       Past Surgical History   Procedure Laterality Date     section      Cholecystectomy      Eye surgery      Hysterectomy      Sinus surgery      Tonsillectomy      Uterine fibroid embolization       Social History     Socioeconomic History    Marital status:      Spouse name: Not on file    Number of children: Not on file    Years of education: Not on file    Highest education level: Not on file   Occupational History    Occupation: Retired   Tobacco Use    Smoking status: Never     Passive exposure: Never    Smokeless tobacco: Never   Vaping Use    Vaping status: Never Used   Substance and Sexual Activity    Alcohol use: Yes     Comment: Occasionally    Drug use: Never    Sexual activity: Yes     Partners: Male     Birth control/protection: Female Sterilization/Tubes Tied   Other Topics Concern    Not on file   Social History Narrative    Not on file     Social Drivers of Health     Financial Resource Strain: Not on file   Food Insecurity: Not on file   Transportation Needs: Not on file   Physical Activity: Not on file   Stress: Not on file   Social Connections: Not on file   Intimate Partner Violence: Not on file   Housing Stability: Not on file     Allergies   Allergen Reactions    Ampicillin Rash    Atenolol      Can't remember reaction but it was not severe.    Cefdinir Rash    Lisinopril      Can't remember reaction but it was not severe.       Objective   No new labs.    Physical Exam  Constitutional:       Appearance: Normal appearance. She is well-developed.   HENT:      Head: Normocephalic and atraumatic.      Right Ear: Tympanic membrane and  ear canal normal.      Left Ear: Tympanic membrane and ear canal normal.      Nose: Nose normal.      Mouth/Throat:      Pharynx: Oropharynx is clear.   Eyes:      General: Lids are normal.      Conjunctiva/sclera: Conjunctivae normal.      Pupils: Pupils are equal, round, and reactive to light.   Neck:      Trachea: Trachea normal.   Cardiovascular:      Rate and Rhythm: Normal rate and regular rhythm.      Heart sounds: Normal heart sounds.   Pulmonary:      Effort: Pulmonary effort is normal.      Breath sounds: Normal breath sounds. No wheezing.   Abdominal:      General: Bowel sounds are normal.      Palpations: Abdomen is soft. There is no mass.      Tenderness: There is no abdominal tenderness. There is no guarding or rebound.   Musculoskeletal:         General: Normal range of motion.      Cervical back: Full passive range of motion without pain and normal range of motion.   Lymphadenopathy:      Cervical: No cervical adenopathy.   Skin:     General: Skin is warm and dry.   Neurological:      General: No focal deficit present.      Mental Status: She is alert and oriented to person, place, and time.   Psychiatric:         Speech: Speech normal.         Behavior: Behavior normal.         Thought Content: Thought content normal.         Judgment: Judgment normal.         Assessment/Plan     Problem List Items Addressed This Visit       Recurrent sinusitis    Relevant Orders    CT SINUS WITHOUT IV CONTRAST     Other Visit Diagnoses         Chronic maxillary sinusitis    -  Primary    Relevant Orders    CT SINUS WITHOUT IV CONTRAST          Orders Placed This Encounter   Procedures    CT SINUS WITHOUT IV CONTRAST     Standing Status:   Future     Number of Occurrences:   1     Expected Date:   6/26/2025     Expiration Date:   6/26/2026     Release to patient:   Immediate [1]     Reviewed, discussed  Saw ent, will ct  Stable with protocol  Will acute tx  Will doxy x 2 w  Will pred  Will add navid Adkins  BETTY Hines DO  6/26/2025

## 2025-06-28 RX ORDER — OMEPRAZOLE 20 MG/1
20 CAPSULE, DELAYED RELEASE ORAL
Qty: 90 CAPSULE | Refills: 1 | Status: SHIPPED | OUTPATIENT
Start: 2025-06-28

## 2025-07-24 ENCOUNTER — HOSPITAL ENCOUNTER (OUTPATIENT)
Facility: CLINIC | Age: 70
Discharge: HOME | End: 2025-07-24
Attending: FAMILY MEDICINE
Payer: MEDICARE

## 2025-07-24 VITALS
HEART RATE: 75 BPM | SYSTOLIC BLOOD PRESSURE: 129 MMHG | OXYGEN SATURATION: 96 % | DIASTOLIC BLOOD PRESSURE: 73 MMHG | TEMPERATURE: 98.2 F

## 2025-07-24 DIAGNOSIS — J01.00 ACUTE NON-RECURRENT MAXILLARY SINUSITIS: Primary | ICD-10-CM

## 2025-07-24 PROCEDURE — 99213 OFFICE O/P EST LOW 20 MIN: CPT | Performed by: FAMILY MEDICINE

## 2025-07-24 RX ORDER — DOXYCYCLINE 100 MG/1
100 CAPSULE ORAL 2 TIMES DAILY
Qty: 20 CAPSULE | Refills: 0 | Status: SHIPPED | OUTPATIENT
Start: 2025-07-24 | End: 2025-08-03

## 2025-07-24 NOTE — DISCHARGE INSTRUCTIONS
Change vector for the Flonase  Take antibiotic as written  Follow up with ENT if not resolving in the next 2 weeks

## 2025-07-24 NOTE — ED PROVIDER NOTES
History  Chief Complaint   Patient presents with    Headache     I saw an endodontist yesterday who took xrays and told me I have a sinus infection, also 2 infected teeth near my sinuses that need a root canal.  Symptoms are sinus drainage, headache, runny nose, fatigue. - Entered by patient     Pt here with chronic sinus congestion and headache.  Treated last for this in  and improved significantly with Doxy and steroid.  Increasing pressure againa nd sinus swelling seen on xray from Endodontist yesterday.  No fever.  No other complaints.  Using Mucinex, claritin and Flonase.          Past Medical History:   Diagnosis Date    Asthma     Herpes genitalia     Hypertension        Past Surgical History   Procedure Laterality Date     section      Cholecystectomy      Eye surgery      Hysterectomy      Sinus surgery      Tonsillectomy      Uterine fibroid embolization         Family History   Problem Relation Name Age of Onset    Lung cancer Biological Mother      Hyperlipidemia Biological Father      Hypertension Biological Father      Heart disease Biological Father      Asthma Biological Sister      Heart disease Maternal Grandfather      No Known Problems Biological Sister      No Known Problems Biological Sister         Social History     Tobacco Use    Smoking status: Never     Passive exposure: Never    Smokeless tobacco: Never   Vaping Use    Vaping status: Never Used   Substance Use Topics    Alcohol use: Yes     Comment: Occasionally    Drug use: Never       Review of Systems    Physical Exam  ED Triage Vitals [25 0933]   Temp Heart Rate Resp BP SpO2   36.8 °C (98.2 °F) 75 -- 129/73 96 %      Temp Source Heart Rate Source Patient Position BP Location FiO2 (%) (Set)   Temporal Monitor Sitting Right upper arm --       Physical Exam  Constitutional:       Appearance: Normal appearance.   HENT:      Head:      Comments: Increased tenderness maxillary sinus bilaterally  Cardiovascular:      Rate  and Rhythm: Normal rate and regular rhythm.      Pulses: Normal pulses.      Heart sounds: Normal heart sounds.   Pulmonary:      Effort: Pulmonary effort is normal.      Breath sounds: Normal breath sounds.   Abdominal:      General: Abdomen is flat.      Palpations: Abdomen is soft.   Neurological:      Mental Status: She is alert.           Procedures  Procedures    UC Course       Medical Decision Making  Chronic sinusitis  Cover with doxy, discussed Flonase vector to get better response from it  Follow up in 1 week if not resolving                     Jason Jensen, DO  07/24/25 1042

## 2025-09-06 RX ORDER — HYDROCHLOROTHIAZIDE 25 MG/1
25 TABLET ORAL DAILY
Qty: 90 TABLET | Refills: 3 | Status: SHIPPED | OUTPATIENT
Start: 2025-09-06